# Patient Record
Sex: FEMALE | Race: BLACK OR AFRICAN AMERICAN | NOT HISPANIC OR LATINO | URBAN - METROPOLITAN AREA
[De-identification: names, ages, dates, MRNs, and addresses within clinical notes are randomized per-mention and may not be internally consistent; named-entity substitution may affect disease eponyms.]

---

## 2017-02-09 ENCOUNTER — HISTORICAL (OUTPATIENT)
Dept: ADMINISTRATIVE | Facility: HOSPITAL | Age: 77
End: 2017-02-09

## 2017-07-07 ENCOUNTER — HISTORICAL (OUTPATIENT)
Dept: ADMINISTRATIVE | Facility: HOSPITAL | Age: 77
End: 2017-07-07

## 2017-07-07 LAB
ALBUMIN SERPL-MCNC: 3.8 GM/DL (ref 3.4–5)
ALBUMIN/GLOB SERPL: 1.1 RATIO (ref 1.1–2)
ALP SERPL-CCNC: 79 UNIT/L (ref 38–126)
ALT SERPL-CCNC: 18 UNIT/L (ref 12–78)
APPEARANCE, UA: CLEAR
AST SERPL-CCNC: 11 UNIT/L (ref 15–37)
BACTERIA #/AREA URNS AUTO: ABNORMAL /HPF
BILIRUB SERPL-MCNC: 0.6 MG/DL (ref 0.2–1)
BILIRUB UR QL STRIP: NEGATIVE
BILIRUBIN DIRECT+TOT PNL SERPL-MCNC: 0.1 MG/DL (ref 0–0.5)
BILIRUBIN DIRECT+TOT PNL SERPL-MCNC: 0.5 MG/DL (ref 0–0.8)
BUN SERPL-MCNC: 42 MG/DL (ref 7–18)
CALCIUM SERPL-MCNC: 8.8 MG/DL (ref 8.5–10.1)
CHLORIDE SERPL-SCNC: 110 MMOL/L (ref 98–107)
CO2 SERPL-SCNC: 24 MMOL/L (ref 21–32)
COLOR UR: YELLOW
CREAT SERPL-MCNC: 2.45 MG/DL (ref 0.55–1.02)
DEPRECATED CALCIDIOL+CALCIFEROL SERPL-MC: 14.61 NG/ML (ref 30–80)
ERYTHROCYTE [DISTWIDTH] IN BLOOD BY AUTOMATED COUNT: 14.1 % (ref 11.5–17)
GLOBULIN SER-MCNC: 3.5 GM/DL (ref 2.4–3.5)
GLUCOSE (UA): NEGATIVE
GLUCOSE SERPL-MCNC: 80 MG/DL (ref 74–106)
HCT VFR BLD AUTO: 33 % (ref 37–47)
HGB BLD-MCNC: 10.6 GM/DL (ref 12–16)
HGB UR QL STRIP: ABNORMAL
KETONES UR QL STRIP: NEGATIVE
LEUKOCYTE ESTERASE UR QL STRIP: NEGATIVE
MCH RBC QN AUTO: 28 PG (ref 27–31)
MCHC RBC AUTO-ENTMCNC: 32.1 GM/DL (ref 33–36)
MCV RBC AUTO: 87.3 FL (ref 80–94)
NITRITE UR QL STRIP.AUTO: NEGATIVE
PH UR STRIP: 5.5 [PH] (ref 5–9)
PLATELET # BLD AUTO: 210 X10(3)/MCL (ref 130–400)
PMV BLD AUTO: 11.1 FL (ref 9.4–12.4)
POTASSIUM SERPL-SCNC: 4.2 MMOL/L (ref 3.5–5.1)
PROT SERPL-MCNC: 7.3 GM/DL (ref 6.4–8.2)
PROT UR QL STRIP: ABNORMAL
PTH-INTACT SERPL-MCNC: 327.8 PG/DL (ref 14–72)
RBC # BLD AUTO: 3.78 X10(6)/MCL (ref 4.2–5.4)
RBC #/AREA URNS HPF: ABNORMAL /[HPF]
SODIUM SERPL-SCNC: 144 MMOL/L (ref 136–145)
SP GR UR STRIP: 1.01 (ref 1–1.03)
SQUAMOUS EPITHELIAL, UA: ABNORMAL
UROBILINOGEN UR STRIP-ACNC: 0.2
WBC # SPEC AUTO: 4.3 X10(3)/MCL (ref 4.5–11.5)
WBC #/AREA URNS AUTO: ABNORMAL /HPF (ref 0–3)

## 2017-09-26 ENCOUNTER — HISTORICAL (OUTPATIENT)
Dept: RADIOLOGY | Facility: HOSPITAL | Age: 77
End: 2017-09-26

## 2017-11-07 ENCOUNTER — HISTORICAL (OUTPATIENT)
Dept: ADMINISTRATIVE | Facility: HOSPITAL | Age: 77
End: 2017-11-07

## 2017-11-07 LAB
ALBUMIN SERPL-MCNC: 4 GM/DL (ref 3.4–5)
ALBUMIN/GLOB SERPL: 1.3 {RATIO}
ALP SERPL-CCNC: 77 UNIT/L (ref 38–126)
ALT SERPL-CCNC: 15 UNIT/L (ref 12–78)
APPEARANCE, UA: CLEAR
AST SERPL-CCNC: 7 UNIT/L (ref 15–37)
BACTERIA #/AREA URNS AUTO: ABNORMAL /HPF
BILIRUB SERPL-MCNC: 0.7 MG/DL (ref 0.2–1)
BILIRUB UR QL STRIP: NEGATIVE
BILIRUBIN DIRECT+TOT PNL SERPL-MCNC: 0.2 MG/DL (ref 0–0.2)
BILIRUBIN DIRECT+TOT PNL SERPL-MCNC: 0.5 MG/DL (ref 0–0.8)
BUN SERPL-MCNC: 41 MG/DL (ref 7–18)
CALCIUM SERPL-MCNC: 8.8 MG/DL (ref 8.5–10.1)
CHLORIDE SERPL-SCNC: 110 MMOL/L (ref 98–107)
CO2 SERPL-SCNC: 24 MMOL/L (ref 21–32)
COLOR UR: YELLOW
CREAT SERPL-MCNC: 2.67 MG/DL (ref 0.55–1.02)
CREAT UR-MCNC: 55.7 MG/DL
DEPRECATED CALCIDIOL+CALCIFEROL SERPL-MC: 18.5 NG/ML (ref 30–80)
ERYTHROCYTE [DISTWIDTH] IN BLOOD BY AUTOMATED COUNT: 14.6 % (ref 11.5–17)
GLOBULIN SER-MCNC: 3.1 GM/DL (ref 2.4–3.5)
GLUCOSE (UA): NEGATIVE
GLUCOSE SERPL-MCNC: 86 MG/DL (ref 74–106)
HCT VFR BLD AUTO: 32.2 % (ref 37–47)
HGB BLD-MCNC: 10.4 GM/DL (ref 12–16)
HGB UR QL STRIP: ABNORMAL
KETONES UR QL STRIP: NEGATIVE
LEUKOCYTE ESTERASE UR QL STRIP: ABNORMAL
MAGNESIUM SERPL-MCNC: 1.8 MG/DL (ref 1.8–2.4)
MCH RBC QN AUTO: 28 PG (ref 27–31)
MCHC RBC AUTO-ENTMCNC: 32.3 GM/DL (ref 33–36)
MCV RBC AUTO: 86.6 FL (ref 80–94)
NITRITE UR QL STRIP.AUTO: NEGATIVE
PH UR STRIP: 5.5 [PH] (ref 5–9)
PLATELET # BLD AUTO: 195 X10(3)/MCL (ref 130–400)
PMV BLD AUTO: 10 FL (ref 9.4–12.4)
POTASSIUM SERPL-SCNC: 4.2 MMOL/L (ref 3.5–5.1)
PROT SERPL-MCNC: 7.1 GM/DL (ref 6.4–8.2)
PROT UR QL STRIP: ABNORMAL
PROT UR STRIP-MCNC: 74.6 MG/DL
PROT/CREAT UR-RTO: 1.3 MG/DL
PTH-INTACT SERPL-MCNC: 370.4 PG/DL (ref 14–72)
RBC # BLD AUTO: 3.72 X10(6)/MCL (ref 4.2–5.4)
RBC #/AREA URNS HPF: ABNORMAL /[HPF]
SODIUM SERPL-SCNC: 143 MMOL/L (ref 136–145)
SP GR UR STRIP: 1.01 (ref 1–1.03)
SQUAMOUS EPITHELIAL, UA: ABNORMAL
UROBILINOGEN UR STRIP-ACNC: 0.2
WBC # SPEC AUTO: 4.9 X10(3)/MCL (ref 4.5–11.5)
WBC #/AREA URNS AUTO: 15 /HPF (ref 0–3)

## 2017-11-09 ENCOUNTER — HISTORICAL (OUTPATIENT)
Dept: ADMINISTRATIVE | Facility: HOSPITAL | Age: 77
End: 2017-11-09

## 2017-11-11 LAB — FINAL CULTURE: NORMAL

## 2018-02-07 ENCOUNTER — HISTORICAL (OUTPATIENT)
Dept: ADMINISTRATIVE | Facility: HOSPITAL | Age: 78
End: 2018-02-07

## 2018-02-07 LAB
ABS NEUT (OLG): 2.85 X10(3)/MCL (ref 2.1–9.2)
ALBUMIN SERPL-MCNC: 3.9 GM/DL (ref 3.4–5)
ALBUMIN/GLOB SERPL: 1.3 RATIO (ref 1.1–2)
ALP SERPL-CCNC: 76 UNIT/L (ref 38–126)
ALT SERPL-CCNC: 15 UNIT/L (ref 12–78)
APPEARANCE, UA: CLEAR
AST SERPL-CCNC: 9 UNIT/L (ref 15–37)
BACTERIA #/AREA URNS AUTO: ABNORMAL /HPF
BILIRUB SERPL-MCNC: 0.8 MG/DL (ref 0.2–1)
BILIRUB UR QL STRIP: NEGATIVE
BILIRUBIN DIRECT+TOT PNL SERPL-MCNC: 0.1 MG/DL (ref 0–0.5)
BILIRUBIN DIRECT+TOT PNL SERPL-MCNC: 0.7 MG/DL (ref 0–0.8)
BUN SERPL-MCNC: 54 MG/DL (ref 7–18)
CALCIUM SERPL-MCNC: 9.3 MG/DL (ref 8.5–10.1)
CHLORIDE SERPL-SCNC: 108 MMOL/L (ref 98–107)
CO2 SERPL-SCNC: 25 MMOL/L (ref 21–32)
COLOR UR: YELLOW
CREAT SERPL-MCNC: 2.83 MG/DL (ref 0.55–1.02)
DEPRECATED CALCIDIOL+CALCIFEROL SERPL-MC: 28.89 NG/ML (ref 30–80)
ERYTHROCYTE [DISTWIDTH] IN BLOOD BY AUTOMATED COUNT: 14.6 % (ref 11.5–17)
EST. AVERAGE GLUCOSE BLD GHB EST-MCNC: 114 MG/DL
GLOBULIN SER-MCNC: 3.1 GM/DL (ref 2.4–3.5)
GLUCOSE (UA): NEGATIVE
GLUCOSE SERPL-MCNC: 83 MG/DL (ref 74–106)
HBA1C MFR BLD: 5.6 % (ref 4.2–6.3)
HCT VFR BLD AUTO: 32.3 % (ref 37–47)
HGB BLD-MCNC: 10.6 GM/DL (ref 12–16)
HGB UR QL STRIP: ABNORMAL
KETONES UR QL STRIP: NEGATIVE
LEUKOCYTE ESTERASE UR QL STRIP: ABNORMAL
MCH RBC QN AUTO: 27.8 PG (ref 27–31)
MCHC RBC AUTO-ENTMCNC: 32.8 GM/DL (ref 33–36)
MCV RBC AUTO: 84.8 FL (ref 80–94)
NITRITE UR QL STRIP.AUTO: NEGATIVE
PH UR STRIP: 5 [PH] (ref 5–9)
PLATELET # BLD AUTO: 220 X10(3)/MCL (ref 130–400)
PMV BLD AUTO: 11.1 FL (ref 9.4–12.4)
POTASSIUM SERPL-SCNC: 4.2 MMOL/L (ref 3.5–5.1)
PROT SERPL-MCNC: 7 GM/DL (ref 6.4–8.2)
PROT UR QL STRIP: ABNORMAL
PTH-INTACT SERPL-MCNC: 281.2 PG/ML (ref 14–72)
RBC # BLD AUTO: 3.81 X10(6)/MCL (ref 4.2–5.4)
RBC #/AREA URNS HPF: ABNORMAL /[HPF]
SODIUM SERPL-SCNC: 141 MMOL/L (ref 136–145)
SP GR UR STRIP: 1.01 (ref 1–1.03)
SQUAMOUS EPITHELIAL, UA: ABNORMAL
UROBILINOGEN UR STRIP-ACNC: 0.2
WBC # SPEC AUTO: 5.1 X10(3)/MCL (ref 4.5–11.5)
WBC #/AREA URNS AUTO: ABNORMAL /HPF (ref 0–3)

## 2018-02-15 ENCOUNTER — HISTORICAL (OUTPATIENT)
Dept: ADMINISTRATIVE | Facility: HOSPITAL | Age: 78
End: 2018-02-15

## 2018-05-08 ENCOUNTER — HISTORICAL (OUTPATIENT)
Dept: ADMINISTRATIVE | Facility: HOSPITAL | Age: 78
End: 2018-05-08

## 2018-05-08 LAB
ABS NEUT (OLG): 2.56 X10(3)/MCL (ref 2.1–9.2)
ALBUMIN SERPL-MCNC: 3.9 GM/DL (ref 3.4–5)
ALBUMIN/GLOB SERPL: 1.1 RATIO (ref 1.1–2)
ALP SERPL-CCNC: 83 UNIT/L (ref 38–126)
ALT SERPL-CCNC: 18 UNIT/L (ref 12–78)
APPEARANCE, UA: CLEAR
AST SERPL-CCNC: 10 UNIT/L (ref 15–37)
BACTERIA SPEC CULT: ABNORMAL /HPF
BASOPHILS # BLD AUTO: 0 X10(3)/MCL (ref 0–0.2)
BASOPHILS NFR BLD AUTO: 1 %
BILIRUB SERPL-MCNC: 0.8 MG/DL (ref 0.2–1)
BILIRUB UR QL STRIP: NEGATIVE
BILIRUBIN DIRECT+TOT PNL SERPL-MCNC: 0.2 MG/DL (ref 0–0.5)
BILIRUBIN DIRECT+TOT PNL SERPL-MCNC: 0.6 MG/DL (ref 0–0.8)
BUN SERPL-MCNC: 52 MG/DL (ref 7–18)
CALCIUM SERPL-MCNC: 9.2 MG/DL (ref 8.5–10.1)
CHLORIDE SERPL-SCNC: 109 MMOL/L (ref 98–107)
CO2 SERPL-SCNC: 22 MMOL/L (ref 21–32)
COLOR UR: YELLOW
CREAT SERPL-MCNC: 3.37 MG/DL (ref 0.55–1.02)
DEPRECATED CALCIDIOL+CALCIFEROL SERPL-MC: 23 NG/ML (ref 30–80)
EOSINOPHIL # BLD AUTO: 0.2 X10(3)/MCL (ref 0–0.9)
EOSINOPHIL NFR BLD AUTO: 4 %
ERYTHROCYTE [DISTWIDTH] IN BLOOD BY AUTOMATED COUNT: 14.1 % (ref 11.5–17)
GLOBULIN SER-MCNC: 3.5 GM/DL (ref 2.4–3.5)
GLUCOSE (UA): NEGATIVE
GLUCOSE SERPL-MCNC: 81 MG/DL (ref 74–106)
HCT VFR BLD AUTO: 33.6 % (ref 37–47)
HGB BLD-MCNC: 10.4 GM/DL (ref 12–16)
HGB UR QL STRIP: ABNORMAL
KETONES UR QL STRIP: NEGATIVE
LEUKOCYTE ESTERASE UR QL STRIP: NEGATIVE
LYMPHOCYTES # BLD AUTO: 1.4 X10(3)/MCL (ref 0.6–4.6)
LYMPHOCYTES NFR BLD AUTO: 29 %
MCH RBC QN AUTO: 27.5 PG (ref 27–31)
MCHC RBC AUTO-ENTMCNC: 31 GM/DL (ref 33–36)
MCV RBC AUTO: 88.9 FL (ref 80–94)
MONOCYTES # BLD AUTO: 0.6 X10(3)/MCL (ref 0.1–1.3)
MONOCYTES NFR BLD AUTO: 12 %
NEUTROPHILS # BLD AUTO: 2.56 X10(3)/MCL (ref 2.1–9.2)
NEUTROPHILS NFR BLD AUTO: 53 %
NITRITE UR QL STRIP: NEGATIVE
PH UR STRIP: 5.5 [PH] (ref 5–9)
PLATELET # BLD AUTO: 210 X10(3)/MCL (ref 130–400)
PMV BLD AUTO: 11.1 FL (ref 9.4–12.4)
POTASSIUM SERPL-SCNC: 5 MMOL/L (ref 3.5–5.1)
PROT SERPL-MCNC: 7.4 GM/DL (ref 6.4–8.2)
PROT UR QL STRIP: ABNORMAL
PTH-INTACT SERPL-MCNC: 369.8 PG/ML (ref 18.4–80.1)
RBC # BLD AUTO: 3.78 X10(6)/MCL (ref 4.2–5.4)
RBC #/AREA URNS HPF: ABNORMAL /[HPF]
SODIUM SERPL-SCNC: 142 MMOL/L (ref 136–145)
SP GR UR STRIP: 1.01 (ref 1–1.03)
SQUAMOUS EPITHELIAL, UA: ABNORMAL
UA WBC MAN: ABNORMAL
UROBILINOGEN UR STRIP-ACNC: 0.2
WBC # SPEC AUTO: 4.8 X10(3)/MCL (ref 4.5–11.5)

## 2018-07-09 ENCOUNTER — HISTORICAL (OUTPATIENT)
Dept: ADMINISTRATIVE | Facility: HOSPITAL | Age: 78
End: 2018-07-09

## 2018-07-09 LAB
ALBUMIN SERPL-MCNC: 3.6 GM/DL (ref 3.4–5)
ALBUMIN/GLOB SERPL: 1.1 RATIO (ref 1.1–2)
ALP SERPL-CCNC: 85 UNIT/L (ref 38–126)
ALT SERPL-CCNC: 16 UNIT/L (ref 12–78)
AST SERPL-CCNC: 11 UNIT/L (ref 15–37)
BILIRUB SERPL-MCNC: 1.8 MG/DL (ref 0.2–1)
BILIRUBIN DIRECT+TOT PNL SERPL-MCNC: 0.2 MG/DL (ref 0–0.5)
BILIRUBIN DIRECT+TOT PNL SERPL-MCNC: 1.6 MG/DL (ref 0–0.8)
BUN SERPL-MCNC: 61 MG/DL (ref 7–18)
CALCIUM SERPL-MCNC: 8.8 MG/DL (ref 8.5–10.1)
CHLORIDE SERPL-SCNC: 109 MMOL/L (ref 98–107)
CO2 SERPL-SCNC: 23 MMOL/L (ref 21–32)
CREAT SERPL-MCNC: 3.31 MG/DL (ref 0.55–1.02)
ERYTHROCYTE [DISTWIDTH] IN BLOOD BY AUTOMATED COUNT: 14.3 % (ref 11.5–17)
GLOBULIN SER-MCNC: 3.4 GM/DL (ref 2.4–3.5)
GLUCOSE SERPL-MCNC: 89 MG/DL (ref 74–106)
HCT VFR BLD AUTO: 30.6 % (ref 37–47)
HGB BLD-MCNC: 9.9 GM/DL (ref 12–16)
MAGNESIUM SERPL-MCNC: 1.8 MG/DL (ref 1.8–2.4)
MCH RBC QN AUTO: 27.8 PG (ref 27–31)
MCHC RBC AUTO-ENTMCNC: 32.4 GM/DL (ref 33–36)
MCV RBC AUTO: 86 FL (ref 80–94)
PHOSPHATE SERPL-MCNC: 3.5 MG/DL (ref 2.5–4.9)
PLATELET # BLD AUTO: 201 X10(3)/MCL (ref 130–400)
PMV BLD AUTO: 10.8 FL (ref 9.4–12.4)
POTASSIUM SERPL-SCNC: 4 MMOL/L (ref 3.5–5.1)
PROT SERPL-MCNC: 7 GM/DL (ref 6.4–8.2)
RBC # BLD AUTO: 3.56 X10(6)/MCL (ref 4.2–5.4)
SODIUM SERPL-SCNC: 142 MMOL/L (ref 136–145)
WBC # SPEC AUTO: 4.4 X10(3)/MCL (ref 4.5–11.5)

## 2018-09-10 ENCOUNTER — HISTORICAL (OUTPATIENT)
Dept: ADMINISTRATIVE | Facility: HOSPITAL | Age: 78
End: 2018-09-10

## 2018-09-10 LAB
ABS NEUT (OLG): 2.36 X10(3)/MCL (ref 2.1–9.2)
ALBUMIN SERPL-MCNC: 4.1 GM/DL (ref 3.4–5)
APPEARANCE, UA: CLEAR
BACTERIA #/AREA URNS AUTO: ABNORMAL /HPF
BASOPHILS # BLD AUTO: 0 X10(3)/MCL (ref 0–0.2)
BASOPHILS NFR BLD AUTO: 1 %
BILIRUB UR QL STRIP: NEGATIVE
BUN SERPL-MCNC: 70 MG/DL (ref 7–18)
CALCIUM SERPL-MCNC: 9.5 MG/DL (ref 8.5–10.1)
CHLORIDE SERPL-SCNC: 111 MMOL/L (ref 98–107)
CO2 SERPL-SCNC: 22 MMOL/L (ref 21–32)
COLOR UR: YELLOW
CREAT SERPL-MCNC: 3.94 MG/DL (ref 0.55–1.02)
DEPRECATED CALCIDIOL+CALCIFEROL SERPL-MC: 16.73 NG/ML (ref 30–80)
EOSINOPHIL # BLD AUTO: 0.1 X10(3)/MCL (ref 0–0.9)
EOSINOPHIL NFR BLD AUTO: 3 %
ERYTHROCYTE [DISTWIDTH] IN BLOOD BY AUTOMATED COUNT: 15.5 % (ref 11.5–17)
GLUCOSE (UA): NEGATIVE
GLUCOSE SERPL-MCNC: 86 MG/DL (ref 74–106)
HCT VFR BLD AUTO: 34.3 % (ref 37–47)
HGB BLD-MCNC: 10.7 GM/DL (ref 12–16)
HGB UR QL STRIP: ABNORMAL
KETONES UR QL STRIP: NEGATIVE
LEUKOCYTE ESTERASE UR QL STRIP: ABNORMAL
LYMPHOCYTES # BLD AUTO: 1.2 X10(3)/MCL (ref 0.6–4.6)
LYMPHOCYTES NFR BLD AUTO: 29 %
MCH RBC QN AUTO: 27.3 PG (ref 27–31)
MCHC RBC AUTO-ENTMCNC: 31.2 GM/DL (ref 33–36)
MCV RBC AUTO: 87.5 FL (ref 80–94)
MONOCYTES # BLD AUTO: 0.5 X10(3)/MCL (ref 0.1–1.3)
MONOCYTES NFR BLD AUTO: 12 %
NEUTROPHILS # BLD AUTO: 2.36 X10(3)/MCL (ref 1.4–7.9)
NEUTROPHILS NFR BLD AUTO: 54 %
NITRITE UR QL STRIP.AUTO: NEGATIVE
PH UR STRIP: 5 [PH] (ref 5–9)
PHOSPHATE SERPL-MCNC: 5.3 MG/DL (ref 2.5–4.9)
PLATELET # BLD AUTO: 221 X10(3)/MCL (ref 130–400)
PMV BLD AUTO: 10.2 FL (ref 9.4–12.4)
POTASSIUM SERPL-SCNC: 5.3 MMOL/L (ref 3.5–5.1)
PROT UR QL STRIP: ABNORMAL
PTH-INTACT SERPL-MCNC: 556.4 PG/ML (ref 18.4–80.1)
RBC # BLD AUTO: 3.92 X10(6)/MCL (ref 4.2–5.4)
RBC #/AREA URNS HPF: ABNORMAL /[HPF]
SODIUM SERPL-SCNC: 144 MMOL/L (ref 136–145)
SP GR UR STRIP: 1.01 (ref 1–1.03)
SQUAMOUS EPITHELIAL, UA: ABNORMAL
UROBILINOGEN UR STRIP-ACNC: 0.2
WBC # SPEC AUTO: 4.3 X10(3)/MCL (ref 4.5–11.5)
WBC #/AREA URNS AUTO: ABNORMAL /HPF (ref 0–3)

## 2018-10-08 ENCOUNTER — HISTORICAL (OUTPATIENT)
Dept: ADMINISTRATIVE | Facility: HOSPITAL | Age: 78
End: 2018-10-08

## 2018-10-08 LAB
ALBUMIN SERPL-MCNC: 3.8 GM/DL (ref 3.4–5)
BUN SERPL-MCNC: 69 MG/DL (ref 7–18)
CALCIUM SERPL-MCNC: 9.4 MG/DL (ref 8.5–10.1)
CHLORIDE SERPL-SCNC: 112 MMOL/L (ref 98–107)
CO2 SERPL-SCNC: 21 MMOL/L (ref 21–32)
CREAT SERPL-MCNC: 3.72 MG/DL (ref 0.55–1.02)
GLUCOSE SERPL-MCNC: 82 MG/DL (ref 74–106)
PHOSPHATE SERPL-MCNC: 4.8 MG/DL (ref 2.5–4.9)
POTASSIUM SERPL-SCNC: 5.4 MMOL/L (ref 3.5–5.1)
SODIUM SERPL-SCNC: 140 MMOL/L (ref 136–145)

## 2018-11-01 ENCOUNTER — HISTORICAL (OUTPATIENT)
Dept: ADMINISTRATIVE | Facility: HOSPITAL | Age: 78
End: 2018-11-01

## 2018-11-01 LAB
ABS NEUT (OLG): 2.49 X10(3)/MCL (ref 2.1–9.2)
ALBUMIN SERPL-MCNC: 3.8 GM/DL (ref 3.4–5)
BASOPHILS # BLD AUTO: 0 X10(3)/MCL (ref 0–0.2)
BASOPHILS NFR BLD AUTO: 1 %
BUN SERPL-MCNC: 71 MG/DL (ref 7–18)
CALCIUM SERPL-MCNC: 8.4 MG/DL (ref 8.5–10.1)
CHLORIDE SERPL-SCNC: 112 MMOL/L (ref 98–107)
CO2 SERPL-SCNC: 20 MMOL/L (ref 21–32)
CREAT SERPL-MCNC: 3.58 MG/DL (ref 0.55–1.02)
EOSINOPHIL # BLD AUTO: 0.1 X10(3)/MCL (ref 0–0.9)
EOSINOPHIL NFR BLD AUTO: 3 %
ERYTHROCYTE [DISTWIDTH] IN BLOOD BY AUTOMATED COUNT: 15.2 % (ref 11.5–17)
GLUCOSE SERPL-MCNC: 82 MG/DL (ref 74–106)
HCT VFR BLD AUTO: 34.9 % (ref 37–47)
HGB BLD-MCNC: 11.2 GM/DL (ref 12–16)
LYMPHOCYTES # BLD AUTO: 1.6 X10(3)/MCL (ref 0.6–4.6)
LYMPHOCYTES NFR BLD AUTO: 33 %
MAGNESIUM SERPL-MCNC: 2.1 MG/DL (ref 1.8–2.4)
MCH RBC QN AUTO: 26.9 PG (ref 27–31)
MCHC RBC AUTO-ENTMCNC: 32.1 GM/DL (ref 33–36)
MCV RBC AUTO: 83.9 FL (ref 80–94)
MONOCYTES # BLD AUTO: 0.6 X10(3)/MCL (ref 0.1–1.3)
MONOCYTES NFR BLD AUTO: 12 %
NEUTROPHILS # BLD AUTO: 2.49 X10(3)/MCL (ref 2.1–9.2)
NEUTROPHILS NFR BLD AUTO: 51 %
PHOSPHATE SERPL-MCNC: 4.6 MG/DL (ref 2.5–4.9)
PLATELET # BLD AUTO: 245 X10(3)/MCL (ref 130–400)
PMV BLD AUTO: 12 FL (ref 9.4–12.4)
POTASSIUM SERPL-SCNC: 4.7 MMOL/L (ref 3.5–5.1)
PTH-INTACT SERPL-MCNC: 373.8 PG/ML (ref 18.4–80.1)
RBC # BLD AUTO: 4.16 X10(6)/MCL (ref 4.2–5.4)
SODIUM SERPL-SCNC: 139 MMOL/L (ref 136–145)
WBC # SPEC AUTO: 4.9 X10(3)/MCL (ref 4.5–11.5)

## 2018-12-17 ENCOUNTER — HISTORICAL (OUTPATIENT)
Dept: RADIOLOGY | Facility: HOSPITAL | Age: 78
End: 2018-12-17

## 2019-01-10 ENCOUNTER — HISTORICAL (OUTPATIENT)
Dept: ADMINISTRATIVE | Facility: HOSPITAL | Age: 79
End: 2019-01-10

## 2019-01-10 LAB
ALBUMIN SERPL-MCNC: 3.8 GM/DL (ref 3.4–5)
BUN SERPL-MCNC: 58 MG/DL (ref 7–18)
CALCIUM SERPL-MCNC: 8.8 MG/DL (ref 8.5–10.1)
CHLORIDE SERPL-SCNC: 110 MMOL/L (ref 98–107)
CO2 SERPL-SCNC: 21 MMOL/L (ref 21–32)
CREAT SERPL-MCNC: 3.45 MG/DL (ref 0.55–1.02)
GLUCOSE SERPL-MCNC: 80 MG/DL (ref 74–106)
PHOSPHATE SERPL-MCNC: 4.3 MG/DL (ref 2.5–4.9)
POTASSIUM SERPL-SCNC: 4.4 MMOL/L (ref 3.5–5.1)
PTH-INTACT SERPL-MCNC: 345.7 PG/ML (ref 18.4–80.1)
SODIUM SERPL-SCNC: 141 MMOL/L (ref 136–145)

## 2019-01-28 ENCOUNTER — HOSPITAL ENCOUNTER (OUTPATIENT)
Dept: MEDSURG UNIT | Facility: HOSPITAL | Age: 79
End: 2019-01-29
Attending: INTERNAL MEDICINE | Admitting: INTERNAL MEDICINE

## 2019-01-28 LAB
ABS NEUT (OLG): 2.56 X10(3)/MCL (ref 2.1–9.2)
ALBUMIN SERPL-MCNC: 3.8 GM/DL (ref 3.4–5)
ALBUMIN/GLOB SERPL: 1 {RATIO}
ALP SERPL-CCNC: 76 UNIT/L (ref 38–126)
ALT SERPL-CCNC: 14 UNIT/L (ref 12–78)
APPEARANCE, UA: CLEAR
AST SERPL-CCNC: 9 UNIT/L (ref 15–37)
BACTERIA SPEC CULT: ABNORMAL /HPF
BASOPHILS # BLD AUTO: 0 X10(3)/MCL (ref 0–0.2)
BASOPHILS NFR BLD AUTO: 1 %
BILIRUB SERPL-MCNC: 0.5 MG/DL (ref 0.2–1)
BILIRUB UR QL STRIP: NEGATIVE
BILIRUBIN DIRECT+TOT PNL SERPL-MCNC: 0.1 MG/DL (ref 0–0.2)
BILIRUBIN DIRECT+TOT PNL SERPL-MCNC: 0.4 MG/DL (ref 0–0.8)
BUN SERPL-MCNC: 64 MG/DL (ref 7–18)
CALCIUM SERPL-MCNC: 8.6 MG/DL (ref 8.5–10.1)
CHLORIDE SERPL-SCNC: 109 MMOL/L (ref 98–107)
CK MB SERPL-MCNC: 1.9 NG/ML (ref 0.5–3.6)
CK MB SERPL-MCNC: 2.5 NG/ML (ref 0.5–3.6)
CK MB SERPL-MCNC: 2.6 NG/ML (ref 0.5–3.6)
CK SERPL-CCNC: 108 UNIT/L (ref 26–192)
CK SERPL-CCNC: 114 UNIT/L (ref 26–192)
CK SERPL-CCNC: 152 UNIT/L (ref 26–192)
CO2 SERPL-SCNC: 21 MMOL/L (ref 21–32)
COLOR UR: YELLOW
CREAT SERPL-MCNC: 3.64 MG/DL (ref 0.55–1.02)
EOSINOPHIL # BLD AUTO: 0.1 X10(3)/MCL (ref 0–0.9)
EOSINOPHIL NFR BLD AUTO: 2 %
ERYTHROCYTE [DISTWIDTH] IN BLOOD BY AUTOMATED COUNT: 16.1 % (ref 11.5–17)
GLOBULIN SER-MCNC: 3.9 GM/DL (ref 2.4–3.5)
GLUCOSE (UA): NEGATIVE
GLUCOSE SERPL-MCNC: 89 MG/DL (ref 74–106)
HCT VFR BLD AUTO: 29.2 % (ref 37–47)
HGB BLD-MCNC: 9.1 GM/DL (ref 12–16)
HGB UR QL STRIP: NEGATIVE
KETONES UR QL STRIP: NEGATIVE
LEUKOCYTE ESTERASE UR QL STRIP: NEGATIVE
LYMPHOCYTES # BLD AUTO: 1.5 X10(3)/MCL (ref 0.6–4.6)
LYMPHOCYTES NFR BLD AUTO: 31 %
MCH RBC QN AUTO: 26.6 PG (ref 27–31)
MCHC RBC AUTO-ENTMCNC: 31.2 GM/DL (ref 33–36)
MCV RBC AUTO: 85.4 FL (ref 80–94)
MONOCYTES # BLD AUTO: 0.6 X10(3)/MCL (ref 0.1–1.3)
MONOCYTES NFR BLD AUTO: 13 %
NEUTROPHILS # BLD AUTO: 2.56 X10(3)/MCL (ref 2.1–9.2)
NEUTROPHILS NFR BLD AUTO: 53 %
NITRITE UR QL STRIP: NEGATIVE
PH UR STRIP: 5 [PH] (ref 5–9)
PLATELET # BLD AUTO: 191 X10(3)/MCL (ref 130–400)
PMV BLD AUTO: 11 FL (ref 9.4–12.4)
POTASSIUM SERPL-SCNC: 4 MMOL/L (ref 3.5–5.1)
PROT SERPL-MCNC: 7.7 GM/DL (ref 6.4–8.2)
PROT UR QL STRIP: ABNORMAL
RBC # BLD AUTO: 3.42 X10(6)/MCL (ref 4.2–5.4)
RBC #/AREA URNS HPF: ABNORMAL /[HPF]
SODIUM SERPL-SCNC: 140 MMOL/L (ref 136–145)
SP GR UR STRIP: 1.01 (ref 1–1.03)
SQUAMOUS EPITHELIAL, UA: ABNORMAL
TROPONIN I SERPL-MCNC: <0.02 NG/ML (ref 0.02–0.49)
UROBILINOGEN UR STRIP-ACNC: 0.2
WBC # SPEC AUTO: 4.8 X10(3)/MCL (ref 4.5–11.5)
WBC #/AREA URNS HPF: ABNORMAL /[HPF]

## 2019-03-14 ENCOUNTER — HISTORICAL (OUTPATIENT)
Dept: ADMINISTRATIVE | Facility: HOSPITAL | Age: 79
End: 2019-03-14

## 2019-03-14 LAB
ALBUMIN SERPL-MCNC: 3.9 GM/DL (ref 3.4–5)
ALBUMIN/GLOB SERPL: 1.2 RATIO (ref 1.1–2)
ALP SERPL-CCNC: 72 UNIT/L (ref 38–126)
ALT SERPL-CCNC: 13 UNIT/L (ref 12–78)
APPEARANCE, UA: CLEAR
AST SERPL-CCNC: 7 UNIT/L (ref 15–37)
BACTERIA #/AREA URNS AUTO: ABNORMAL /HPF
BILIRUB SERPL-MCNC: 0.7 MG/DL (ref 0.2–1)
BILIRUB UR QL STRIP: NEGATIVE
BILIRUBIN DIRECT+TOT PNL SERPL-MCNC: 0.2 MG/DL (ref 0–0.5)
BILIRUBIN DIRECT+TOT PNL SERPL-MCNC: 0.5 MG/DL (ref 0–0.8)
BUN SERPL-MCNC: 69 MG/DL (ref 7–18)
CALCIUM SERPL-MCNC: 9 MG/DL (ref 8.5–10.1)
CHLORIDE SERPL-SCNC: 112 MMOL/L (ref 98–107)
CO2 SERPL-SCNC: 20 MMOL/L (ref 21–32)
COLOR UR: YELLOW
CREAT SERPL-MCNC: 3.5 MG/DL (ref 0.55–1.02)
DEPRECATED CALCIDIOL+CALCIFEROL SERPL-MC: 20.57 NG/ML (ref 30–80)
ERYTHROCYTE [DISTWIDTH] IN BLOOD BY AUTOMATED COUNT: 15.8 % (ref 11.5–17)
GLOBULIN SER-MCNC: 3.2 GM/DL (ref 2.4–3.5)
GLUCOSE (UA): NEGATIVE
GLUCOSE SERPL-MCNC: 84 MG/DL (ref 74–106)
HCT VFR BLD AUTO: 32.1 % (ref 37–47)
HGB BLD-MCNC: 9.9 GM/DL (ref 12–16)
HGB UR QL STRIP: NEGATIVE
KETONES UR QL STRIP: NEGATIVE
LEUKOCYTE ESTERASE UR QL STRIP: NEGATIVE
MAGNESIUM SERPL-MCNC: 2.1 MG/DL (ref 1.8–2.4)
MCH RBC QN AUTO: 28 PG (ref 27–31)
MCHC RBC AUTO-ENTMCNC: 30.8 GM/DL (ref 33–36)
MCV RBC AUTO: 90.7 FL (ref 80–94)
NITRITE UR QL STRIP.AUTO: NEGATIVE
PH UR STRIP: 5 [PH] (ref 5–9)
PLATELET # BLD AUTO: 230 X10(3)/MCL (ref 130–400)
PMV BLD AUTO: 11 FL (ref 9.4–12.4)
POTASSIUM SERPL-SCNC: 4.4 MMOL/L (ref 3.5–5.1)
PROT SERPL-MCNC: 7.1 GM/DL (ref 6.4–8.2)
PROT UR QL STRIP: ABNORMAL
PTH-INTACT SERPL-MCNC: 269.1 PG/ML (ref 18.4–80.1)
RBC # BLD AUTO: 3.54 X10(6)/MCL (ref 4.2–5.4)
RBC #/AREA URNS HPF: ABNORMAL /[HPF]
SODIUM SERPL-SCNC: 141 MMOL/L (ref 136–145)
SP GR UR STRIP: 1.01 (ref 1–1.03)
SQUAMOUS EPITHELIAL, UA: ABNORMAL
UROBILINOGEN UR STRIP-ACNC: 0.2
WBC # SPEC AUTO: 3.7 X10(3)/MCL (ref 4.5–11.5)
WBC #/AREA URNS AUTO: ABNORMAL /HPF (ref 0–3)

## 2019-05-09 ENCOUNTER — HISTORICAL (OUTPATIENT)
Dept: ADMINISTRATIVE | Facility: HOSPITAL | Age: 79
End: 2019-05-09

## 2019-05-09 LAB
ALBUMIN SERPL-MCNC: 3.5 GM/DL (ref 3.4–5)
ALBUMIN/GLOB SERPL: 1 RATIO (ref 1.1–2)
ALP SERPL-CCNC: 70 UNIT/L (ref 38–126)
ALT SERPL-CCNC: 18 UNIT/L (ref 12–78)
APPEARANCE, UA: CLEAR
AST SERPL-CCNC: 7 UNIT/L (ref 15–37)
BACTERIA #/AREA URNS AUTO: ABNORMAL /HPF
BILIRUB SERPL-MCNC: 0.6 MG/DL (ref 0.2–1)
BILIRUB UR QL STRIP: NEGATIVE
BILIRUBIN DIRECT+TOT PNL SERPL-MCNC: 0.2 MG/DL (ref 0–0.5)
BILIRUBIN DIRECT+TOT PNL SERPL-MCNC: 0.4 MG/DL (ref 0–0.8)
BUN SERPL-MCNC: 73 MG/DL (ref 7–18)
CALCIUM SERPL-MCNC: 8.5 MG/DL (ref 8.5–10.1)
CHLORIDE SERPL-SCNC: 112 MMOL/L (ref 98–107)
CO2 SERPL-SCNC: 20 MMOL/L (ref 21–32)
COLOR UR: YELLOW
CREAT SERPL-MCNC: 3.86 MG/DL (ref 0.55–1.02)
CREAT UR-MCNC: 54 MG/DL
ERYTHROCYTE [DISTWIDTH] IN BLOOD BY AUTOMATED COUNT: 15.1 % (ref 11.5–17)
GLOBULIN SER-MCNC: 3.5 GM/DL (ref 2.4–3.5)
GLUCOSE (UA): NEGATIVE
GLUCOSE SERPL-MCNC: 83 MG/DL (ref 74–106)
HCT VFR BLD AUTO: 34.4 % (ref 37–47)
HGB BLD-MCNC: 10.5 GM/DL (ref 12–16)
HGB UR QL STRIP: NEGATIVE
KETONES UR QL STRIP: NEGATIVE
LEUKOCYTE ESTERASE UR QL STRIP: NEGATIVE
MCH RBC QN AUTO: 26.6 PG (ref 27–31)
MCHC RBC AUTO-ENTMCNC: 30.5 GM/DL (ref 33–36)
MCV RBC AUTO: 87.3 FL (ref 80–94)
MICROALBUMIN UR-MCNC: 26.7 MG/DL
MICROALBUMIN/CREAT RATIO PNL UR: 494.4 MG/GM CR (ref 0–30)
NITRITE UR QL STRIP.AUTO: NEGATIVE
PH UR STRIP: 5 [PH] (ref 5–9)
PLATELET # BLD AUTO: 232 X10(3)/MCL (ref 130–400)
PMV BLD AUTO: 12.1 FL (ref 9.4–12.4)
POTASSIUM SERPL-SCNC: 4.2 MMOL/L (ref 3.5–5.1)
PROT SERPL-MCNC: 7 GM/DL (ref 6.4–8.2)
PROT UR QL STRIP: ABNORMAL
PTH-INTACT SERPL-MCNC: 572.7 PG/ML (ref 18.4–80.1)
RBC # BLD AUTO: 3.94 X10(6)/MCL (ref 4.2–5.4)
RBC #/AREA URNS HPF: ABNORMAL /[HPF]
SODIUM SERPL-SCNC: 140 MMOL/L (ref 136–145)
SP GR UR STRIP: 1.01 (ref 1–1.03)
SQUAMOUS EPITHELIAL, UA: ABNORMAL
UROBILINOGEN UR STRIP-ACNC: 0.2
WBC # SPEC AUTO: 5 X10(3)/MCL (ref 4.5–11.5)
WBC #/AREA URNS AUTO: ABNORMAL /HPF (ref 0–3)

## 2019-06-13 ENCOUNTER — HISTORICAL (OUTPATIENT)
Dept: ADMINISTRATIVE | Facility: HOSPITAL | Age: 79
End: 2019-06-13

## 2019-06-13 LAB
ALBUMIN SERPL-MCNC: 4 GM/DL (ref 3.4–5)
BUN SERPL-MCNC: 70 MG/DL (ref 7–18)
CALCIUM SERPL-MCNC: 9.3 MG/DL (ref 8.5–10.1)
CHLORIDE SERPL-SCNC: 105 MMOL/L (ref 98–107)
CO2 SERPL-SCNC: 22 MMOL/L (ref 21–32)
CREAT SERPL-MCNC: 3.85 MG/DL (ref 0.55–1.02)
GLUCOSE SERPL-MCNC: 88 MG/DL (ref 74–106)
PHOSPHATE SERPL-MCNC: 4.8 MG/DL (ref 2.5–4.9)
POTASSIUM SERPL-SCNC: 4.4 MMOL/L (ref 3.5–5.1)
SODIUM SERPL-SCNC: 140 MMOL/L (ref 136–145)

## 2019-07-19 ENCOUNTER — HISTORICAL (OUTPATIENT)
Dept: ADMINISTRATIVE | Facility: HOSPITAL | Age: 79
End: 2019-07-19

## 2019-07-19 LAB
ALBUMIN SERPL-MCNC: 4 GM/DL (ref 3.4–5)
ALBUMIN/GLOB SERPL: 1.1 {RATIO}
ALP SERPL-CCNC: 78 UNIT/L (ref 38–126)
ALT SERPL-CCNC: 16 UNIT/L (ref 12–78)
AST SERPL-CCNC: 9 UNIT/L (ref 15–37)
BILIRUB SERPL-MCNC: 0.6 MG/DL (ref 0.2–1)
BILIRUBIN DIRECT+TOT PNL SERPL-MCNC: 0.1 MG/DL (ref 0–0.2)
BILIRUBIN DIRECT+TOT PNL SERPL-MCNC: 0.5 MG/DL (ref 0–0.8)
BUN SERPL-MCNC: 80 MG/DL (ref 7–18)
CALCIUM SERPL-MCNC: 9.2 MG/DL (ref 8.5–10.1)
CHLORIDE SERPL-SCNC: 108 MMOL/L (ref 98–107)
CO2 SERPL-SCNC: 20 MMOL/L (ref 21–32)
CREAT SERPL-MCNC: 4.21 MG/DL (ref 0.55–1.02)
ERYTHROCYTE [DISTWIDTH] IN BLOOD BY AUTOMATED COUNT: 15.9 % (ref 11.5–17)
GLOBULIN SER-MCNC: 3.7 GM/DL (ref 2.4–3.5)
GLUCOSE SERPL-MCNC: 84 MG/DL (ref 74–106)
HCT VFR BLD AUTO: 32.8 % (ref 37–47)
HGB BLD-MCNC: 10.4 GM/DL (ref 12–16)
MAGNESIUM SERPL-MCNC: 2.1 MG/DL (ref 1.8–2.4)
MCH RBC QN AUTO: 26.8 PG (ref 27–31)
MCHC RBC AUTO-ENTMCNC: 31.7 GM/DL (ref 33–36)
MCV RBC AUTO: 84.5 FL (ref 80–94)
PLATELET # BLD AUTO: 215 X10(3)/MCL (ref 130–400)
PMV BLD AUTO: 11.5 FL (ref 9.4–12.4)
POTASSIUM SERPL-SCNC: 4.4 MMOL/L (ref 3.5–5.1)
PROT SERPL-MCNC: 7.7 GM/DL (ref 6.4–8.2)
RBC # BLD AUTO: 3.88 X10(6)/MCL (ref 4.2–5.4)
SODIUM SERPL-SCNC: 138 MMOL/L (ref 136–145)
WBC # SPEC AUTO: 4.3 X10(3)/MCL (ref 4.5–11.5)

## 2019-08-16 ENCOUNTER — HISTORICAL (OUTPATIENT)
Dept: ADMINISTRATIVE | Facility: HOSPITAL | Age: 79
End: 2019-08-16

## 2019-08-16 LAB
ALBUMIN SERPL-MCNC: 3.8 GM/DL (ref 3.4–5)
BUN SERPL-MCNC: 73 MG/DL (ref 7–18)
CALCIUM SERPL-MCNC: 9.1 MG/DL (ref 8.5–10.1)
CHLORIDE SERPL-SCNC: 110 MMOL/L (ref 98–107)
CO2 SERPL-SCNC: 22 MMOL/L (ref 21–32)
CREAT SERPL-MCNC: 3.81 MG/DL (ref 0.55–1.02)
ERYTHROCYTE [DISTWIDTH] IN BLOOD BY AUTOMATED COUNT: 16.5 % (ref 11.5–17)
GLUCOSE SERPL-MCNC: 85 MG/DL (ref 74–106)
HCT VFR BLD AUTO: 28.4 % (ref 37–47)
HGB BLD-MCNC: 8.8 GM/DL (ref 12–16)
MAGNESIUM SERPL-MCNC: 1.9 MG/DL (ref 1.8–2.4)
MCH RBC QN AUTO: 26.3 PG (ref 27–31)
MCHC RBC AUTO-ENTMCNC: 31 GM/DL (ref 33–36)
MCV RBC AUTO: 85 FL (ref 80–94)
PHOSPHATE SERPL-MCNC: 4.4 MG/DL (ref 2.5–4.9)
PLATELET # BLD AUTO: 194 X10(3)/MCL (ref 130–400)
PMV BLD AUTO: 10.7 FL (ref 9.4–12.4)
POTASSIUM SERPL-SCNC: 4.4 MMOL/L (ref 3.5–5.1)
RBC # BLD AUTO: 3.34 X10(6)/MCL (ref 4.2–5.4)
SODIUM SERPL-SCNC: 142 MMOL/L (ref 136–145)
WBC # SPEC AUTO: 4.4 X10(3)/MCL (ref 4.5–11.5)

## 2019-10-04 ENCOUNTER — HISTORICAL (OUTPATIENT)
Dept: ADMINISTRATIVE | Facility: HOSPITAL | Age: 79
End: 2019-10-04

## 2019-10-04 LAB
ALBUMIN SERPL-MCNC: 4 GM/DL (ref 3.4–5)
BUN SERPL-MCNC: 89 MG/DL (ref 7–18)
CALCIUM SERPL-MCNC: 8.6 MG/DL (ref 8.5–10.1)
CHLORIDE SERPL-SCNC: 110 MMOL/L (ref 98–107)
CO2 SERPL-SCNC: 21 MMOL/L (ref 21–32)
CREAT SERPL-MCNC: 4.92 MG/DL (ref 0.55–1.02)
ERYTHROCYTE [DISTWIDTH] IN BLOOD BY AUTOMATED COUNT: 15.6 % (ref 11.5–17)
GLUCOSE SERPL-MCNC: 85 MG/DL (ref 74–106)
HCT VFR BLD AUTO: 31.2 % (ref 37–47)
HGB BLD-MCNC: 9.9 GM/DL (ref 12–16)
MCH RBC QN AUTO: 27.9 PG (ref 27–31)
MCHC RBC AUTO-ENTMCNC: 31.7 GM/DL (ref 33–36)
MCV RBC AUTO: 87.9 FL (ref 80–94)
PHOSPHATE SERPL-MCNC: 4.4 MG/DL (ref 2.5–4.9)
PLATELET # BLD AUTO: 212 X10(3)/MCL (ref 130–400)
PMV BLD AUTO: 11.4 FL (ref 9.4–12.4)
POTASSIUM SERPL-SCNC: 4.5 MMOL/L (ref 3.5–5.1)
RBC # BLD AUTO: 3.55 X10(6)/MCL (ref 4.2–5.4)
SODIUM SERPL-SCNC: 140 MMOL/L (ref 136–145)
WBC # SPEC AUTO: 3.8 X10(3)/MCL (ref 4.5–11.5)

## 2019-11-07 ENCOUNTER — HISTORICAL (OUTPATIENT)
Dept: ADMINISTRATIVE | Facility: HOSPITAL | Age: 79
End: 2019-11-07

## 2019-11-07 LAB
ALBUMIN SERPL-MCNC: 3.8 GM/DL (ref 3.4–5)
BUN SERPL-MCNC: 78 MG/DL (ref 7–18)
CALCIUM SERPL-MCNC: 8.5 MG/DL (ref 8.5–10.1)
CHLORIDE SERPL-SCNC: 114 MMOL/L (ref 98–107)
CO2 SERPL-SCNC: 20 MMOL/L (ref 21–32)
CREAT SERPL-MCNC: 4.68 MG/DL (ref 0.55–1.02)
ERYTHROCYTE [DISTWIDTH] IN BLOOD BY AUTOMATED COUNT: 15.2 % (ref 11.5–17)
GLUCOSE SERPL-MCNC: 86 MG/DL (ref 74–106)
HCT VFR BLD AUTO: 27.7 % (ref 37–47)
HGB BLD-MCNC: 8.6 GM/DL (ref 12–16)
MAGNESIUM SERPL-MCNC: 1.6 MG/DL (ref 1.8–2.4)
MCH RBC QN AUTO: 27.7 PG (ref 27–31)
MCHC RBC AUTO-ENTMCNC: 31 GM/DL (ref 33–36)
MCV RBC AUTO: 89.1 FL (ref 80–94)
PHOSPHATE SERPL-MCNC: 4.5 MG/DL (ref 2.5–4.9)
PLATELET # BLD AUTO: 178 X10(3)/MCL (ref 130–400)
PMV BLD AUTO: 10.5 FL (ref 9.4–12.4)
POTASSIUM SERPL-SCNC: 4.8 MMOL/L (ref 3.5–5.1)
RBC # BLD AUTO: 3.11 X10(6)/MCL (ref 4.2–5.4)
SODIUM SERPL-SCNC: 141 MMOL/L (ref 136–145)
WBC # SPEC AUTO: 4.2 X10(3)/MCL (ref 4.5–11.5)

## 2019-12-18 ENCOUNTER — HISTORICAL (OUTPATIENT)
Dept: RADIOLOGY | Facility: HOSPITAL | Age: 79
End: 2019-12-18

## 2019-12-30 ENCOUNTER — HISTORICAL (OUTPATIENT)
Dept: ADMINISTRATIVE | Facility: HOSPITAL | Age: 79
End: 2019-12-30

## 2019-12-30 LAB
ALBUMIN SERPL-MCNC: 3.5 GM/DL (ref 3.4–5)
BUN SERPL-MCNC: 63 MG/DL (ref 7–18)
CALCIUM SERPL-MCNC: 7.9 MG/DL (ref 8.5–10.1)
CHLORIDE SERPL-SCNC: 116 MMOL/L (ref 98–107)
CO2 SERPL-SCNC: 18 MMOL/L (ref 21–32)
CREAT SERPL-MCNC: 4.43 MG/DL (ref 0.55–1.02)
ERYTHROCYTE [DISTWIDTH] IN BLOOD BY AUTOMATED COUNT: 15.9 % (ref 11.5–17)
GLUCOSE SERPL-MCNC: 87 MG/DL (ref 74–106)
HCT VFR BLD AUTO: 29.2 % (ref 37–47)
HGB BLD-MCNC: 9.1 GM/DL (ref 12–16)
MCH RBC QN AUTO: 27.3 PG (ref 27–31)
MCHC RBC AUTO-ENTMCNC: 31.2 GM/DL (ref 33–36)
MCV RBC AUTO: 87.7 FL (ref 80–94)
PHOSPHATE SERPL-MCNC: 4.4 MG/DL (ref 2.5–4.9)
PLATELET # BLD AUTO: 237 X10(3)/MCL (ref 130–400)
PMV BLD AUTO: 11.3 FL (ref 9.4–12.4)
POTASSIUM SERPL-SCNC: 4.8 MMOL/L (ref 3.5–5.1)
PTH-INTACT SERPL-MCNC: 628.3 PG/ML (ref 18.4–80.1)
RBC # BLD AUTO: 3.33 X10(6)/MCL (ref 4.2–5.4)
SODIUM SERPL-SCNC: 144 MMOL/L (ref 136–145)
WBC # SPEC AUTO: 4.3 X10(3)/MCL (ref 4.5–11.5)

## 2020-01-21 ENCOUNTER — HISTORICAL (OUTPATIENT)
Dept: PREADMISSION TESTING | Facility: HOSPITAL | Age: 80
End: 2020-01-21

## 2020-01-21 LAB
ABS NEUT (OLG): 2.56 X10(3)/MCL (ref 2.1–9.2)
BASOPHILS # BLD AUTO: 0 X10(3)/MCL (ref 0–0.2)
BASOPHILS NFR BLD AUTO: 1 %
BUN SERPL-MCNC: 67 MG/DL (ref 7–18)
CALCIUM SERPL-MCNC: 8.2 MG/DL (ref 8.5–10.1)
CHLORIDE SERPL-SCNC: 111 MMOL/L (ref 98–107)
CO2 SERPL-SCNC: 19 MMOL/L (ref 21–32)
CREAT SERPL-MCNC: 4.37 MG/DL (ref 0.55–1.02)
CREAT/UREA NIT SERPL: 15.3
EOSINOPHIL # BLD AUTO: 0.2 X10(3)/MCL (ref 0–0.9)
EOSINOPHIL NFR BLD AUTO: 4 %
ERYTHROCYTE [DISTWIDTH] IN BLOOD BY AUTOMATED COUNT: 17.1 % (ref 11.5–17)
GLUCOSE SERPL-MCNC: 82 MG/DL (ref 74–106)
HCT VFR BLD AUTO: 31.3 % (ref 37–47)
HGB BLD-MCNC: 9.7 GM/DL (ref 12–16)
LYMPHOCYTES # BLD AUTO: 1.2 X10(3)/MCL (ref 0.6–4.6)
LYMPHOCYTES NFR BLD AUTO: 25 %
MCH RBC QN AUTO: 27.2 PG (ref 27–31)
MCHC RBC AUTO-ENTMCNC: 31 GM/DL (ref 33–36)
MCV RBC AUTO: 87.7 FL (ref 80–94)
MONOCYTES # BLD AUTO: 0.7 X10(3)/MCL (ref 0.1–1.3)
MONOCYTES NFR BLD AUTO: 14 %
NEUTROPHILS # BLD AUTO: 2.56 X10(3)/MCL (ref 2.1–9.2)
NEUTROPHILS NFR BLD AUTO: 55 %
PLATELET # BLD AUTO: 252 X10(3)/MCL (ref 130–400)
PMV BLD AUTO: 10.8 FL (ref 9.4–12.4)
POTASSIUM SERPL-SCNC: 5.1 MMOL/L (ref 3.5–5.1)
RBC # BLD AUTO: 3.57 X10(6)/MCL (ref 4.2–5.4)
SODIUM SERPL-SCNC: 139 MMOL/L (ref 136–145)
WBC # SPEC AUTO: 4.6 X10(3)/MCL (ref 4.5–11.5)

## 2020-01-31 ENCOUNTER — HISTORICAL (OUTPATIENT)
Dept: SURGERY | Facility: HOSPITAL | Age: 80
End: 2020-01-31

## 2020-01-31 LAB
ANION GAP SERPL CALC-SCNC: 14 MMOL/L
BUN SERPL-MCNC: >50 MG/DL (ref 8–26)
CHLORIDE SERPL-SCNC: 112 MMOL/L (ref 98–109)
CREAT SERPL-MCNC: 5.1 MG/DL (ref 0.6–1.3)
GLUCOSE SERPL-MCNC: 85 MG/DL (ref 70–105)
HCT VFR BLD CALC: 33 % (ref 38–51)
HGB BLD-MCNC: 11.2 MG/DL (ref 12–17)
POC IONIZED CALCIUM: 1.21 MMOL/L (ref 1.12–1.32)
POC TCO2: 19 MMOL/L (ref 24–29)
POTASSIUM BLD-SCNC: 4.4 MMOL/L (ref 3.5–4.9)
SODIUM BLD-SCNC: 139 MMOL/L (ref 138–146)

## 2020-02-24 ENCOUNTER — HISTORICAL (OUTPATIENT)
Dept: ADMINISTRATIVE | Facility: HOSPITAL | Age: 80
End: 2020-02-24

## 2020-02-24 LAB
ALBUMIN SERPL-MCNC: 3.6 GM/DL (ref 3.4–5)
ALBUMIN/GLOB SERPL: 0.9 {RATIO}
ALP SERPL-CCNC: 83 UNIT/L (ref 38–126)
ALT SERPL-CCNC: 17 UNIT/L (ref 12–78)
APPEARANCE, UA: CLEAR
AST SERPL-CCNC: 7 UNIT/L (ref 15–37)
BACTERIA #/AREA URNS AUTO: ABNORMAL /HPF
BILIRUB SERPL-MCNC: 0.7 MG/DL (ref 0.2–1)
BILIRUB UR QL STRIP: NEGATIVE
BILIRUBIN DIRECT+TOT PNL SERPL-MCNC: 0.1 MG/DL (ref 0–0.2)
BILIRUBIN DIRECT+TOT PNL SERPL-MCNC: 0.6 MG/DL (ref 0–0.8)
BUN SERPL-MCNC: 84 MG/DL (ref 7–18)
CALCIUM SERPL-MCNC: 8.4 MG/DL (ref 8.5–10.1)
CHLORIDE SERPL-SCNC: 108 MMOL/L (ref 98–107)
CO2 SERPL-SCNC: 20 MMOL/L (ref 21–32)
COLOR UR: YELLOW
CREAT SERPL-MCNC: 5.19 MG/DL (ref 0.55–1.02)
ERYTHROCYTE [DISTWIDTH] IN BLOOD BY AUTOMATED COUNT: 16.1 % (ref 11.5–17)
GLOBULIN SER-MCNC: 3.9 GM/DL (ref 2.4–3.5)
GLUCOSE (UA): NEGATIVE
GLUCOSE SERPL-MCNC: 86 MG/DL (ref 74–106)
HCT VFR BLD AUTO: 26.5 % (ref 37–47)
HGB BLD-MCNC: 8.2 GM/DL (ref 12–16)
HGB UR QL STRIP: ABNORMAL
KETONES UR QL STRIP: NEGATIVE
LEUKOCYTE ESTERASE UR QL STRIP: ABNORMAL
MCH RBC QN AUTO: 27.3 PG (ref 27–31)
MCHC RBC AUTO-ENTMCNC: 30.9 GM/DL (ref 33–36)
MCV RBC AUTO: 88.3 FL (ref 80–94)
NITRITE UR QL STRIP.AUTO: NEGATIVE
PH UR STRIP: 5 [PH] (ref 5–9)
PLATELET # BLD AUTO: 247 X10(3)/MCL (ref 130–400)
PMV BLD AUTO: 10.5 FL (ref 9.4–12.4)
POTASSIUM SERPL-SCNC: 4.5 MMOL/L (ref 3.5–5.1)
PROT SERPL-MCNC: 7.5 GM/DL (ref 6.4–8.2)
PROT UR QL STRIP: ABNORMAL
RBC # BLD AUTO: 3 X10(6)/MCL (ref 4.2–5.4)
RBC #/AREA URNS HPF: ABNORMAL /[HPF]
SODIUM SERPL-SCNC: 138 MMOL/L (ref 136–145)
SP GR UR STRIP: 1.01 (ref 1–1.03)
SQUAMOUS EPITHELIAL, UA: ABNORMAL
UROBILINOGEN UR STRIP-ACNC: 0.2
WBC # SPEC AUTO: 4.1 X10(3)/MCL (ref 4.5–11.5)
WBC #/AREA URNS AUTO: ABNORMAL /HPF (ref 0–3)

## 2020-03-13 ENCOUNTER — HISTORICAL (OUTPATIENT)
Dept: ADMINISTRATIVE | Facility: HOSPITAL | Age: 80
End: 2020-03-13

## 2020-03-13 LAB
ALBUMIN SERPL-MCNC: 3.4 GM/DL (ref 3.4–5)
BUN SERPL-MCNC: 137 MG/DL (ref 7–18)
CALCIUM SERPL-MCNC: 6.5 MG/DL (ref 8.5–10.1)
CHLORIDE SERPL-SCNC: 105 MMOL/L (ref 98–107)
CO2 SERPL-SCNC: 15 MMOL/L (ref 21–32)
CREAT SERPL-MCNC: 8.38 MG/DL (ref 0.55–1.02)
ERYTHROCYTE [DISTWIDTH] IN BLOOD BY AUTOMATED COUNT: 16.5 % (ref 11.5–17)
GLUCOSE SERPL-MCNC: 86 MG/DL (ref 74–106)
HCT VFR BLD AUTO: 24.2 % (ref 37–47)
HGB BLD-MCNC: 7.4 GM/DL (ref 12–16)
MAGNESIUM SERPL-MCNC: 1.6 MG/DL (ref 1.8–2.4)
MCH RBC QN AUTO: 26.2 PG (ref 27–31)
MCHC RBC AUTO-ENTMCNC: 30.6 GM/DL (ref 33–36)
MCV RBC AUTO: 85.8 FL (ref 80–94)
PHOSPHATE SERPL-MCNC: 8.5 MG/DL (ref 2.5–4.9)
PLATELET # BLD AUTO: 195 X10(3)/MCL (ref 130–400)
PMV BLD AUTO: 11.3 FL (ref 9.4–12.4)
POTASSIUM SERPL-SCNC: 5.2 MMOL/L (ref 3.5–5.1)
RBC # BLD AUTO: 2.82 X10(6)/MCL (ref 4.2–5.4)
SODIUM SERPL-SCNC: 134 MMOL/L (ref 136–145)
WBC # SPEC AUTO: 2.7 X10(3)/MCL (ref 4.5–11.5)

## 2020-05-05 LAB
ABS NEUT (OLG): 2.98 X10(3)/MCL (ref 2.1–9.2)
BASOPHILS # BLD AUTO: 0 X10(3)/MCL (ref 0–0.2)
BASOPHILS NFR BLD AUTO: 1 %
BUN SERPL-MCNC: 38 MG/DL (ref 9.8–20.1)
CALCIUM SERPL-MCNC: 8.8 MG/DL (ref 8.4–10.2)
CHLORIDE SERPL-SCNC: 103 MMOL/L (ref 98–107)
CO2 SERPL-SCNC: 25 MMOL/L (ref 23–31)
CREAT SERPL-MCNC: 5.86 MG/DL (ref 0.55–1.02)
CREAT/UREA NIT SERPL: 6
EOSINOPHIL # BLD AUTO: 0.3 X10(3)/MCL (ref 0–0.9)
EOSINOPHIL NFR BLD AUTO: 4 %
ERYTHROCYTE [DISTWIDTH] IN BLOOD BY AUTOMATED COUNT: 18.9 % (ref 11.5–17)
GLUCOSE SERPL-MCNC: 94 MG/DL (ref 82–115)
HCT VFR BLD AUTO: 35.3 % (ref 37–47)
HGB BLD-MCNC: 10.9 GM/DL (ref 12–16)
LYMPHOCYTES # BLD AUTO: 2 X10(3)/MCL (ref 0.6–4.6)
LYMPHOCYTES NFR BLD AUTO: 33 %
MCH RBC QN AUTO: 28.8 PG (ref 27–31)
MCHC RBC AUTO-ENTMCNC: 30.9 GM/DL (ref 33–36)
MCV RBC AUTO: 93.1 FL (ref 80–94)
MONOCYTES # BLD AUTO: 0.8 X10(3)/MCL (ref 0.1–1.3)
MONOCYTES NFR BLD AUTO: 13 %
NEUTROPHILS # BLD AUTO: 2.98 X10(3)/MCL (ref 2.1–9.2)
NEUTROPHILS NFR BLD AUTO: 48 %
PLATELET # BLD AUTO: 277 X10(3)/MCL (ref 130–400)
PMV BLD AUTO: 10.5 FL (ref 9.4–12.4)
POTASSIUM SERPL-SCNC: 4.3 MMOL/L (ref 3.5–5.1)
RBC # BLD AUTO: 3.79 X10(6)/MCL (ref 4.2–5.4)
SODIUM SERPL-SCNC: 141 MMOL/L (ref 136–145)
WBC # SPEC AUTO: 6.2 X10(3)/MCL (ref 4.5–11.5)

## 2020-05-08 ENCOUNTER — HISTORICAL (OUTPATIENT)
Dept: SURGERY | Facility: HOSPITAL | Age: 80
End: 2020-05-08

## 2020-05-08 LAB
ANION GAP SERPL CALC-SCNC: 11 MMOL/L
BUN SERPL-MCNC: >50 MG/DL (ref 8–26)
CHLORIDE SERPL-SCNC: 107 MMOL/L (ref 98–109)
CREAT SERPL-MCNC: 8.2 MG/DL (ref 0.6–1.3)
GLUCOSE SERPL-MCNC: 79 MG/DL (ref 70–105)
HCT VFR BLD CALC: 31 % (ref 38–51)
HGB BLD-MCNC: 10.5 MG/DL (ref 12–17)
POC IONIZED CALCIUM: 1.08 MMOL/L (ref 1.12–1.32)
POC TCO2: 23 MMOL/L (ref 24–29)
POTASSIUM BLD-SCNC: 4.3 MMOL/L (ref 3.5–4.9)
SODIUM BLD-SCNC: 136 MMOL/L (ref 138–146)

## 2020-12-22 ENCOUNTER — HISTORICAL (OUTPATIENT)
Dept: RADIOLOGY | Facility: HOSPITAL | Age: 80
End: 2020-12-22

## 2021-12-27 ENCOUNTER — HISTORICAL (OUTPATIENT)
Dept: RADIOLOGY | Facility: HOSPITAL | Age: 81
End: 2021-12-27

## 2022-01-13 ENCOUNTER — HISTORICAL (OUTPATIENT)
Dept: RADIOLOGY | Facility: HOSPITAL | Age: 82
End: 2022-01-13

## 2022-04-14 ENCOUNTER — HISTORICAL (OUTPATIENT)
Dept: RADIOLOGY | Facility: HOSPITAL | Age: 82
End: 2022-04-14
Payer: MEDICARE

## 2022-04-14 ENCOUNTER — HISTORICAL (OUTPATIENT)
Dept: ADMINISTRATIVE | Facility: HOSPITAL | Age: 82
End: 2022-04-14

## 2022-04-30 NOTE — DISCHARGE SUMMARY
Patient:   Jyoti Ruby            MRN: [id number]            FIN: [id number]               Age:   78 years     Sex:  Female     :  1940   Associated Diagnoses:   None   Author:   Cyril Saavedra NP      see progress note as dc summary

## 2022-04-30 NOTE — ED PROVIDER NOTES
"   Patient:   Jyoti Ruby            MRN: [id number]            FIN: [id number]               Age:   78 years     Sex:  Female     :  1940   Associated Diagnoses:   Chest pain   Author:   Bria JIMENEZ, Kaushik PUENTES      Basic Information   Time seen: Date & time 2019 03:19:00.   History source: Patient.   Arrival mode: Private vehicle.   History limitation: None.   Additional information: Patient's physician(s): Doron JIMENEZ , Angelica Valladares, Chief Complaint from Nursing Triage Note : Chief Complaint   2019 2:42 CST       Chief Complaint           pt complains of L sided chest pain, under L breast for a few hours; pt denies SOB, weakness; pt states same pain occured 2 days ago, resolved on its own; pt denies any cardiac Hx  .      History of Present Illness   The patient presents with   77 y/o AA female with hx of HTN presents to the ED, c/o chest pain. Pt states she has had left inferior chest pain for "a few hours". Pt denies any SOB, belching, or nausea. Pt notes she also has had faster heart rate for 2 days. .  The onset was   "a few hours".  The course/duration of symptoms is constant.  Location: Left anterior inferior chest. Radiating pain: none. The character of symptoms is   "pain".  The degree at onset was moderate.  The degree at maximum was moderate.  The degree at present is moderate.  The exacerbating factor is none.  The relieving factor is none.  Risk factors consist of hypertension.  Prior episodes: none.  Therapy today None.  Associated symptoms: none.        Review of Systems   Constitutional symptoms:  No fever, no chills.    Skin symptoms:  Negative except as documented in HPI.   Respiratory symptoms:  No shortness of breath, no cough, no sputum production.    Cardiovascular symptoms:  Chest pain, left chest, inferior, moderate pain, No palpitations,    Gastrointestinal symptoms:  No abdominal pain, no nausea, no vomiting.    Psychiatric symptoms:  Negative except as " documented in HPI.             Additional review of systems information: All other systems reviewed and otherwise negative.      Health Status   Allergies:    Allergic Reactions (Selected)  No Known Allergies.   Medications:  (Selected)   Prescriptions  Prescribed  EC-Naprosyn 500 mg oral delayed release tablet: 500 mg = 1 tab(s), Oral, BID, # 14 tab(s), 0 Refill(s)  Norco 5 mg-325 mg oral tablet: 1 tab(s), Oral, q6hr, # 10 tab(s), 0 Refill(s)  Skelaxin 800 mg oral tablet: 800 mg = 1 tab(s), Oral, TID, # 12 tab(s), 0 Refill(s)  Toradol 10 mg oral tablet: 10 mg = 1 tab(s), Oral, q4hr, PRN for pain, # 12 tab(s), 0 Refill(s)  Documented Medications  Documented  Onglyza 5 mg oral tablet: 5 mg = 1 tab(s), Oral, Daily, # 90 tab(s), 0 Refill(s)  Vitamin D3: 0 Refill(s)  benzonatate 100 mg oral capsule: 100 mg = 1 cap(s), Oral, TID, # 30 cap(s), 0 Refill(s)  calcitriol 0.5 mcg oral capsule: 0.5 mcg = 1 cap(s), Oral, Daily, 0 Refill(s)  diltiazem 180 mg/24 hours oral TABlet, extended release: 180 mg = 1 tab(s), Oral, Daily, # 90 tab(s), 0 Refill(s)  labetalol 100 mg oral tablet: 150 mg = 1.5 tab(s), Oral, BID, # 180 tab(s), 0 Refill(s).      Past Medical/ Family/ Social History   Medical history:    Active  Arthritis (8636429)  GERD - Gastro-esophageal reflux disease (9090698158)  Hypertension (13722866)  Kidney disease (455412700)  Multiple myeloma ([id number])  Resolved  Diabetes ([id number]):  Resolved..   Surgical history:    Hysterectomy (SNOMED CT 884721302).  back sx.  Appendectomy (ICD-9-CM 47.0)..   Family history:    No family history items have been selected or recorded..   Social history: Alcohol use: Denies, Tobacco use: Denies.      Physical Examination               Vital Signs   Vital Signs   1/28/2019 3:04 CST       Peripheral Pulse Rate     60 bpm                             Heart Rate Monitored      60 bpm  (Modified)                             Respiratory Rate          16 br/min                             SpO2                      97 %                             Oxygen Therapy            Room air                             Systolic Blood Pressure   220 mmHg  HI                             Diastolic Blood Pressure  83 mmHg                             Mean Arterial Pressure, Cuff              129 mmHg    1/28/2019 2:42 CST       Temperature Oral          36.5 DegC                             Temperature Oral (calculated)             97.70 DegF                             Peripheral Pulse Rate     64 bpm                             Respiratory Rate          18 br/min                             SpO2                      100 %                             Oxygen Therapy            Room air                             Systolic Blood Pressure   211 mmHg  HI                             Diastolic Blood Pressure  139 mmHg  HI  .   Measurements   1/28/2019 2:42 CST       Weight Estimated          78 kg                             Height/Length Estimated   152 cm                             Body Mass Index Estimated 33.76 kg/m2  .   Basic Oxygen Information   1/28/2019 3:04 CST       SpO2                      97 %                             Oxygen Therapy            Room air    1/28/2019 2:42 CST       SpO2                      100 %                             Oxygen Therapy            Room air  .   General:  Alert, no acute distress, well appearing, conversant.    Skin:  Warm, dry, intact.    Head:  Normocephalic, atraumatic.    Neck:  Supple, trachea midline.    Eye:  Pupils are equal, round and reactive to light, extraocular movements are intact, normal conjunctiva.    Ears, nose, mouth and throat:  Oral mucosa moist.   Cardiovascular:  Regular rate and rhythm, No murmur, Normal peripheral perfusion, No edema.    Respiratory:  Lungs are clear to auscultation, respirations are non-labored, breath sounds are equal, Symmetrical chest wall expansion.    Chest  wall:  No tenderness, On exam: Left, anterior, inferior, mild, tenderness.    Musculoskeletal:  Normal ROM, normal strength, no tenderness, no swelling, no deformity.    Gastrointestinal:  Soft, Nontender, Non distended.    Neurological:  Alert and oriented to person, place, time, and situation, No focal neurological deficit observed, CN II-XII intact, normal sensory observed, normal motor observed, normal speech observed, normal coordination observed.    Psychiatric:  Cooperative, appropriate mood & affect, normal judgment.       Medical Decision Making   Documents reviewed:  Emergency department nurses' notes.   Orders    Laboratory    Troponin-I, Kaushik Fraser MD, 01/28/19, 03:08, Ordered    CK, Kaushik Fraser MD, 01/28/19, 03:08, Ordered    CK MB, Kaushik Fraser MD, 01/28/19, 03:08, Ordered    CBC w/ Auto Diff, Kaushik Fraser MD, 01/28/19, 03:08, Ordered    CMP, Kaushik Fraser MD, 01/28/19, 03:08, Ordered    Urinalysis with microscopic a reflex to culture, Kaushik Fraser MD, 01/28/19, 03:08, Ordered  Xray    CXR 1 View, Kaushik Fraser MD, 01/28/19, 03:08, Ordered .   Results review:  Lab results : Lab View   1/28/2019 3:15 CST       UA Appear                 CLEAR                             UA Color                  YELLOW                             UA Spec Grav              1.012                             UA Bili                   Negative                             UA pH                     5.0                             UA Urobilinogen           0.2                             UA Blood                  Negative                             UA Glucose                Negative                             UA Ketones                Negative                             UA Protein                1+                             UA Nitrite                Negative                             UA Leuk Est               Negative                             UA WBC                     NONE SEEN                             UA RBC                    NONE SEEN                             UA Bacteria               NONE SEEN /HPF                             UA Squam Epithelial       NONE SEEN    1/28/2019 3:00 CST       Sodium Lvl                140 mmol/L                             Potassium Lvl             4.0 mmol/L                             Chloride                  109 mmol/L  HI                             CO2                       21.0 mmol/L                             Calcium Lvl               8.6 mg/dL                             Glucose Lvl               89 mg/dL                             BUN                       64.0 mg/dL  HI                             Creatinine                3.64 mg/dL  HI                             eGFR-AA                   16 mL/min/1.73 m2  NA                             eGFR-JOANNA                  13 mL/min/1.73 m2  NA                             Bili Total                0.5 mg/dL                             Bili Direct               0.10 mg/dL                             Bili Indirect             0.40 mg/dL                             AST                       9 unit/L  LOW                             ALT                       14 unit/L                             Alk Phos                  76 unit/L                             Total Protein             7.7 gm/dL                             Albumin Lvl               3.80 gm/dL                             Globulin                  3.90 gm/dL  HI                             A/G Ratio                 1.0  NA                             Total CK                  108 unit/L                             CK MB                     1.9 ng/mL                             Troponin-I                <0.02 ng/mL                             WBC                       4.8 x10(3)/mcL                             RBC                       3.42 x10(6)/mcL  LOW                             Hgb                       9.1  gm/dL  LOW                             Hct                       29.2 %  LOW                             Platelet                  191 x10(3)/mcL                             MCV                       85.4 fL                             MCH                       26.6 pg  LOW                             MCHC                      31.2 gm/dL  LOW                             RDW                       16.1 %                             MPV                       11.0 fL                             Abs Neut                  2.56 x10(3)/mcL                             Neutro Auto               53 %  NA                             Lymph Auto                31 %  NA                             Mono Auto                 13 %  NA                             Eos Auto                  2 %  NA                             Abs Eos                   0.1 x10(3)/mcL                             Basophil Auto             1 %  NA                             Abs Neutro                2.56 x10(3)/mcL                             Abs Lymph                 1.5 x10(3)/mcL                             Abs Mono                  0.6 x10(3)/mcL                             Abs Baso                  0.0 x10(3)/mcL  .      Reexamination/ Reevaluation   Time: 1/28/2019 06:00:00 .   Vital signs   results included from flowsheet : Vital Signs   1/28/2019 5:38 CST       Peripheral Pulse Rate     61 bpm                             Heart Rate Monitored      64 bpm                             Respiratory Rate          18 br/min                             SpO2                      97 %                             Oxygen Therapy            Room air                             Systolic Blood Pressure   154 mmHg  HI                             Diastolic Blood Pressure  70 mmHg                             Mean Arterial Pressure, Cuff              98 mmHg     Course: improving.   Pain status: decreased, improved with ntg  .      Impression and Plan   Diagnosis    Chest pain (AHX69-MD R07.9)      Calls-Consults   -  1/28/2019 05:58:00 , CIS paged.    Plan   Condition: Stable.    Disposition: Place in Observation Telemetry Unit.    Counseled: Patient, Family, Regarding diagnosis, Regarding diagnostic results, Regarding treatment plan, Patient indicated understanding of instructions.    Notes: I, Cayden Womack, acted solely as a scribe for and in the presence of Dr. Fraser who performed the service..       Addendum      Teaching-Supervisory Addendum-Brief   Notes: I, Dr. Fraser, personally performed the services described in this documentation as scribed in my presence and it is both accurate and complete..

## 2022-04-30 NOTE — OP NOTE
DATE OF SURGERY:    05/08/2020    SURGEON:  Cate Bass MD  ASSISTANT:  CHIN Bojorquez    DIAGNOSIS:    1. End stage renal disease.    2. Mechanical complication of left upper arm brachiocephalic fistula.    PROCEDURE PERFORMED:  Left arm fistula revision with segmental resection, reconstruction, branch ligation, and superficialization.    HISTORY OF PRESENT ILLNESS:  Mrs. Jyoti Ruby is a 80-year-old woman with end-stage renal disease, who has a previously created left brachiocephalic fistula.   While the most proximal segment of the fistula has achieved a good size and depth, the remainder of the fistula is excessively deep for cannulation and also has tortuosity.  She presents today for revision of her fistula.    DESCRIPTION OF PROCEDURE:  Mrs. Ruby was taken to the operating room after receiving a regional block to her left arm.  Left arm was prepped and draped in the usual sterile fashion.  Appropriate timeout was performed.  Antibiotics were administered.  B-mode ultrasound was utilized to identify the course of the cephalic vein in the upper arm.  It was indeed deep and tortuous in her upper arm.  A long incision approximately 18 cm in length was made over the course of the cephalic vein.  We were able to isolate the vein and mobilize it,  and we ligated multiple branches utilizing 3-0 silk ties and surgical clips.  After fully mobilizing the vein, it did have severe tortuosity that needed to be resolved prior to superficialization.  We resected an approximately 4-5 cm segment of tortuous vein and then performed end-to-end anastomosis utilizing interrupted 6-0 Prolene sutures.  We gave heparin prior to this maneuver, and after restoring flow, we gave protamine.  There was good thrill to the fistula, and we were able to achieve good hemostasis.  The anastomosis did not have significant tension at completion.  3-0 Vicryl suture was utilized to reapproximate the deeper  subcutaneous tissues to elevate the floor of the wound and superficialize the fistula.  We then utilized a 4-0 Monocryl to reapproximate the skin.  Dermabond Prineo     dressing was placed.  Pippa Quinones assisted throughout.  She has assisted with vessel exposure, branch ligation.  She also performed skin closure.  She assisted with our anastomosis as well.      ______________________________  MD GILL Veloz/AMITA  DD:  05/08/2020  Time:  10:35AM  DT:  05/08/2020  Time:  10:45AM  Job #:  [id number]

## 2022-04-30 NOTE — OP NOTE
Patient:   Jyoti Ruby            MRN: [id number]            FIN: [id number]               Age:   79 years     Sex:  Female     :  1940   Associated Diagnoses:   Chronic kidney disease, stage 5   Author:   Cate Bass MD      Operative Note   Operative Information   Date/ Time:  2020 13:35:00.     Procedures Performed: Left brachiocephalic fistula   .     Indications: Mrs. Ruby is a 78 y/o woman with CKD who needs long term hemodialysis access creation.   Preoperative mapping suggests a suitable left upper arm cephalic vein..     Preoperative Diagnosis: Chronic kidney disease, stage 5 (VHJ42-SM N18.5).     Postoperative Diagnosis: Chronic kidney disease, stage 5 (CTC08-HH N18.5).     Surgeon: Cate Bass MD.     Assistant: Pippa Del Real.     Speciman Removed: none   .     Esimated blood loss: loss less than  100  cc.     Description of Procedure/Findings/    Complications: The risks and benefits of the procedure were discussed with the patient prior to the procedure and the patient desires to proceed.   Her left arm was prepped in the usual sterile fashion.  Cefzol was administered prior to the incision.  An appropriate time out was performed.  Ultrasound was utilized to identify the cephalic vein in the upper arm.  It was patent throughout and appropriate in size in the upper arm howevere it was deep and had tortuosity.   A curvilinear incision was made over the brachial artery and the cephalic vein just proximal to the antecubital fossa.   Dissection was performed through the superficial soft tissues and then followed the appropriate plane laterally and medially to identify the cephalic vein and brachial artery.  Each of these vessels were isolated and side branches were ligated with 3-0 silk when appropriate.  The cephalic vein was ligated distally with a 3-0 silk. The brachial artery had no disease and appeared appropriate for fistula creation.  The cephalic  vein easily accepted a 4mm dilator and demonstrated a thrill with heparin saline injection.  4000 units of heparin was given IV.  A longitudinal incision was made on the brachial artery.  The vein was spatulated appropriately.  Anastamosis was created with a 6-0 prolene suture. Hemostasis was obtained and the soft tissue was dissected to allow for appropriate lay of the vein graft.  3-0 vicryl was utilized to close the subcutaneous structures and a 4-0 monocryl was utlilzed to close the skin. Dermabond was used to dress the wound. This completed the procedure.      I expect that she will likely need later revision given the depth of the vessel.    Pippa Quinones assisted throughout. She assisted with exposure of the artery and the vein.  She assisted with vessel anastamosis as well.  She performed skin closure.     .     Findings:    ,    .     Complications: None.

## 2023-08-09 ENCOUNTER — TELEPHONE (OUTPATIENT)
Dept: CARDIOLOGY | Facility: HOSPITAL | Age: 83
End: 2023-08-09
Payer: MEDICARE

## 2023-08-09 RX ORDER — SODIUM CHLORIDE 0.9 % (FLUSH) 0.9 %
10 SYRINGE (ML) INJECTION
Status: CANCELLED | OUTPATIENT
Start: 2023-08-09

## 2023-08-09 NOTE — TELEPHONE ENCOUNTER
Tracy with [facility] called reporting access dysfucntion on Ms. Ruby. She is having increased Venous pressures and some prolonged bleeding issues. Contacted the patient-no answer. Contacted the patients daughter-no answer. Called center back and gave them date/time for procedure next week. Requested they review the pre op instrucitons with patient (faxed to them). Erin stated agreement. [place]

## 2023-08-14 ENCOUNTER — HOSPITAL ENCOUNTER (OUTPATIENT)
Facility: HOSPITAL | Age: 83
Discharge: HOME | End: 2023-08-14
Attending: STUDENT IN AN ORGANIZED HEALTH CARE EDUCATION/TRAINING PROGRAM | Admitting: STUDENT IN AN ORGANIZED HEALTH CARE EDUCATION/TRAINING PROGRAM
Payer: MEDICARE

## 2023-08-14 VITALS
RESPIRATION RATE: 19 BRPM | HEIGHT: 60 IN | WEIGHT: 188.5 LBS | BODY MASS INDEX: 37.01 KG/M2 | HEART RATE: 55 BPM | TEMPERATURE: 98 F | DIASTOLIC BLOOD PRESSURE: 76 MMHG | SYSTOLIC BLOOD PRESSURE: 168 MMHG | OXYGEN SATURATION: 99 %

## 2023-08-14 DIAGNOSIS — T82.590A MECHANICAL COMPLICATION OF ARTERIOVENOUS FISTULA SURGICALLY CREATED, INITIAL ENCOUNTER: ICD-10-CM

## 2023-08-14 PROCEDURE — 36902 INTRO CATH DIALYSIS CIRCUIT: CPT | Mod: LT | Performed by: STUDENT IN AN ORGANIZED HEALTH CARE EDUCATION/TRAINING PROGRAM

## 2023-08-14 PROCEDURE — 25500020 PHARM REV CODE 255: Performed by: STUDENT IN AN ORGANIZED HEALTH CARE EDUCATION/TRAINING PROGRAM

## 2023-08-14 PROCEDURE — 25000003 PHARM REV CODE 250: Performed by: STUDENT IN AN ORGANIZED HEALTH CARE EDUCATION/TRAINING PROGRAM

## 2023-08-14 PROCEDURE — 63600175 PHARM REV CODE 636 W HCPCS: Performed by: STUDENT IN AN ORGANIZED HEALTH CARE EDUCATION/TRAINING PROGRAM

## 2023-08-14 PROCEDURE — 99152 MOD SED SAME PHYS/QHP 5/>YRS: CPT | Performed by: STUDENT IN AN ORGANIZED HEALTH CARE EDUCATION/TRAINING PROGRAM

## 2023-08-14 PROCEDURE — 99152 MOD SED SAME PHYS/QHP 5/>YRS: CPT | Mod: ,,, | Performed by: STUDENT IN AN ORGANIZED HEALTH CARE EDUCATION/TRAINING PROGRAM

## 2023-08-14 PROCEDURE — 99213 OFFICE O/P EST LOW 20 MIN: CPT | Mod: 25,,, | Performed by: STUDENT IN AN ORGANIZED HEALTH CARE EDUCATION/TRAINING PROGRAM

## 2023-08-14 PROCEDURE — C1769 GUIDE WIRE: HCPCS | Performed by: STUDENT IN AN ORGANIZED HEALTH CARE EDUCATION/TRAINING PROGRAM

## 2023-08-14 PROCEDURE — C1894 INTRO/SHEATH, NON-LASER: HCPCS | Performed by: STUDENT IN AN ORGANIZED HEALTH CARE EDUCATION/TRAINING PROGRAM

## 2023-08-14 PROCEDURE — 36902 PR INTRO CATH, DIALYSIS CIRCUIT W/TRANSLML BALLOON ANGIO: ICD-10-PCS | Mod: LT,,, | Performed by: STUDENT IN AN ORGANIZED HEALTH CARE EDUCATION/TRAINING PROGRAM

## 2023-08-14 PROCEDURE — 36902 INTRO CATH DIALYSIS CIRCUIT: CPT | Mod: LT,,, | Performed by: STUDENT IN AN ORGANIZED HEALTH CARE EDUCATION/TRAINING PROGRAM

## 2023-08-14 PROCEDURE — 27201423 OPTIME MED/SURG SUP & DEVICES STERILE SUPPLY: Performed by: STUDENT IN AN ORGANIZED HEALTH CARE EDUCATION/TRAINING PROGRAM

## 2023-08-14 PROCEDURE — 99213 PR OFFICE/OUTPT VISIT, EST, LEVL III, 20-29 MIN: ICD-10-PCS | Mod: 25,,, | Performed by: STUDENT IN AN ORGANIZED HEALTH CARE EDUCATION/TRAINING PROGRAM

## 2023-08-14 PROCEDURE — C1725 CATH, TRANSLUMIN NON-LASER: HCPCS | Performed by: STUDENT IN AN ORGANIZED HEALTH CARE EDUCATION/TRAINING PROGRAM

## 2023-08-14 PROCEDURE — 99152 PR MOD CONSCIOUS SEDATION, SAME PHYS, 5+ YRS, FIRST 15 MIN: ICD-10-PCS | Mod: ,,, | Performed by: STUDENT IN AN ORGANIZED HEALTH CARE EDUCATION/TRAINING PROGRAM

## 2023-08-14 RX ORDER — LABETALOL 200 MG/1
200 TABLET, FILM COATED ORAL 2 TIMES DAILY
COMMUNITY

## 2023-08-14 RX ORDER — FUROSEMIDE 20 MG/1
20 TABLET ORAL DAILY PRN
COMMUNITY

## 2023-08-14 RX ORDER — SEVELAMER CARBONATE 800 MG/1
1600 TABLET, FILM COATED ORAL
COMMUNITY

## 2023-08-14 RX ORDER — AMLODIPINE BESYLATE 5 MG/1
5 TABLET ORAL DAILY
COMMUNITY

## 2023-08-14 RX ORDER — SODIUM BICARBONATE 650 MG/1
650 TABLET ORAL 2 TIMES DAILY
COMMUNITY

## 2023-08-14 RX ORDER — MIDAZOLAM HYDROCHLORIDE 1 MG/ML
INJECTION INTRAMUSCULAR; INTRAVENOUS
Status: DISCONTINUED | OUTPATIENT
Start: 2023-08-14 | End: 2023-08-14 | Stop reason: HOSPADM

## 2023-08-14 RX ORDER — FOLIC ACID-PYRIDOXINE-CYANOCOBALAMIN TAB 2.5-25-2 MG 2.5-25-2 MG
1 TAB ORAL DAILY
COMMUNITY

## 2023-08-14 RX ORDER — LOSARTAN POTASSIUM 100 MG/1
100 TABLET ORAL 2 TIMES DAILY
COMMUNITY

## 2023-08-14 RX ORDER — LIDOCAINE HYDROCHLORIDE 10 MG/ML
INJECTION INFILTRATION; PERINEURAL
Status: DISCONTINUED | OUTPATIENT
Start: 2023-08-14 | End: 2023-08-14 | Stop reason: HOSPADM

## 2023-08-14 RX ORDER — FENTANYL CITRATE 50 UG/ML
INJECTION, SOLUTION INTRAMUSCULAR; INTRAVENOUS
Status: DISCONTINUED | OUTPATIENT
Start: 2023-08-14 | End: 2023-08-14 | Stop reason: HOSPADM

## 2023-08-14 RX ORDER — CALCITRIOL 0.25 UG/1
0.25 CAPSULE ORAL DAILY
COMMUNITY

## 2023-08-14 NOTE — H&P
INTERVENTIONAL NEPHROLOGY HISTORY & PHYSICAL       Patient Name: Jyoti Mullinsfreeman SILVESTRE 1940    Date: 2023  Time: 10:57 AM         HPI: 83 y.o. female with ESRD on HD via left brachiocephalic arteriovenous fistula who presents with increasing venous pressures and one episode of prolonged bleeding. Pt has noticed this problem over the past 1-2 weeks. Pt is being prepared for fistulogram with possible intervention today.    [facility] Family is present. Risks and benefits of fistulogram with possible intervention and intravenous conscious sedation was reviewed with the patient. The patient agrees to proceed with the intended procedure. Consents for both intravenous conscious sedation and procedure were signed and placed within the chart.       Review of Systems:  General:  No fatigue  Skin: No rashes  HEENT: No vision changes  CVS: No CP  RS: No SOB  GIT: No abdominal pain  Extremities: No swelling  Neurological:  No focal weakness  Psych: No depression    Past Medical History:   Diagnosis Date    Dialysis complication     Hypertension     Obesity, unspecified       History reviewed. No pertinent surgical history.   Review of patient's allergies indicates:  No Known Allergies   Social History     Tobacco Use    Smoking status: Never    Smokeless tobacco: Never   Substance Use Topics    Alcohol use: Not Currently    Drug use: Never      History reviewed. No pertinent family history.    No current facility-administered medications for this encounter.    Vital Signs:  Temp:  [98.3 °F (36.8 °C)] 98.3 °F (36.8 °C)  Pulse:  [56-76] 56  SpO2:  [95 %] 95 %  BP: (197-227)/(73-85) 197/73     Physical Exam:  General: NAD  HEENT: NC/AT, EOMI  CVS: RRR.  RS: breathing easily.  Abdominal: Soft, NT/ND.  Extremities: No edema b/l LE  Skin: No rash, no lesions.  Neurological: No focal deficits.  Psych: Normal affect  Dialysis Access: left brachiocephalic arteriovenous fistula  with pulsatility in the middle fistula onward. Light thrill.    Results:    Lab Results   Component Value Date     (L) 07/13/2022    K 4.2 07/13/2022    CL 93 (L) 07/13/2022    CO2 25 07/13/2022    BUN 65 (H) 07/13/2022    CREATININE 7.78 (H) 07/13/2022     Lab Results   Component Value Date    WBC 6.2 05/05/2020    HGB 10.9 (L) 05/05/2020     05/05/2020    MCV 93.1 05/05/2020       Assessment and Plan:      ESRD on HD via left brachiocephalic arteriovenous fistula.  Mechanical complication of AVF.  Pt with ESRD on HD via left brachiocephalic arteriovenous fistula who presents today with increased venous pressures and one episode of prolonged bleeding. The pt is being prepared for fistulogram with possible intervention today.  - Consents obtained and placed within chart.  - Will proceed in cath lab setting today.    Please feel free to reach me with any questions.    Deshawn Rangel,   Interventional Nephrology  Cell: [phone]

## 2023-08-14 NOTE — Clinical Note
The balloon was inflated with indeflator in the  . The balloon max pressure was 14 yue for 26 seconds

## 2023-08-14 NOTE — Clinical Note
The balloon was inflated with indeflator in the  . The balloon max pressure was 12 yue for 65 seconds

## 2023-08-14 NOTE — PROCEDURES
INTERVENTIONAL NEPHROLOGY PROCEDURE NOTE: FISTULOGRAM/GRAFTOGRAM         Patient Name: Jyoti Ruby  KONRAD 1940    Procedure Date:    2023    Performing Physician:   Dr. Rangel    Access History: Pt is with ESRD on HD typically via left brachiocephalic arteriovenous fistula who presents today with increased venous pressures and recent prolonged bleeding event.    Pre-Op Diagnosis: T82.590A Mechanical complication of surgically created arteriovenous shunt, initial encounter, N18.6 End Stage Renal Disease (ESRD)  Post-Op Diagnosis: Same    Procedure: Fistulogram with possible angioplasty and stent placement.    Indication: Nonfunctional/Dysfunctional/Malfunctioning HD access.    Informed Consent:  The patient was evaluated in the pre-operative area with assessment including the American Society of Anesthesia risk classification. The procedure is discussed in detail including risks, benefits alternatives and options and the patient agrees to proceed. Informed consent was obtained from the patient.     Maximum sterile barrier technique: The patient was prepped and draped using chlorhexidine prep and maximum sterile barrier technique.    Sedation Note:  Risks and benefits of sedation were reviewed with the patient or surrogate, including bleeding, infection, nausea, vomiting, dizziness, instability, damage to a nerve, damage to a blood vessel, cellulitis, reaction to medications, amnesia, loss of consciousness, respiratory arrest, cardiac arrest.     The patient received the following medications: Versed 1 mg IV and Fentanyl 75 mcg IV; patient did remain alert, responsive, and conversational throughout the procedure. I was personally responsible for supervising the administration of moderate sedation services during the procedure performed and I affirm all the guidelines and requirements described in the CPT 2023 section on moderate sedation were followed, including the use of an independent trained  observer who had no other duties during the procedure. The total face-to-face time was 15 minutes.    Procedure Steps:     The patient was prepped and draped in sterile fashion. Procedure ultrasound revealed patent vascular HD access.    Local anesthesia was administered by injecting 1% lidocaine at the intended site of cannulation. With use of live ultrasonographic visualization, the vascular access was successfully cannulated with a mini-stick needle aimed towards the outflow. After blood flashback was noted, the mini-stick wire was advanced through the needle. Via Seldinger technique, the needle was exchanged for the mini-stick sheath. Angiograms were completed which revealed 1 lesion(s) (summarized below). A 150 cm glide wire was advanced through the mini-stick sheath with the tip parked in the cephalic arch. A 6 Fr sheath was exchanged via Seldinger technique for the mini-stick sheath.    The aforementioned lesion(s) are as below, followed by their respective intervention(s) :  #1: Approximately 95% stenosis in the cephalic arch.  Angioplasty was performed at this location using a [employer]  7 mm x 40 mm balloon. Approximately 100% effacement was obtained at 10-15 SOHAIL and was held for 1-5 seconds. Post-angioplasty angiogram revealed 15% residual stenosis. Pt was moderately symptomatic.  * Retrograde angiogram showed patent inflow segment without notable lesions.    Lesions were treated in the following order: #1.    Wire and sheath were removed and hemostasis was achieved using a purse-string stitch and light digital pressure at the site of cannulation.    ASSESSMENT/PLAN:  - Successful angioplasty of the cephalic arch.    EBL: 5 ml    Contrast: 24 ml    Complications: None    Post-op Instructions: The patient was given both verbal and written post-op instructions. If excessive bleeding at the site, they have been instructed to call their physician or proceed to a local emergency room.    Orders  to the dialysis unit: OK to use access for dialysis needs.    Thank you for allowing me the opportunity in taking care of this patient. Please reach me with any questions.    Deshawn Rangel DO  Interventional Nephrology  Cell: [phone]

## 2023-08-14 NOTE — DISCHARGE SUMMARY
INTERVENTIONAL NEPHROLOGY DISCHARGE SUMMARY         Patient Name: Jyoti Ruby   1940    Procedure Date: 2023      In brief, Ms. Ruby underwent fistulogram of her L BC AVF 2/2 increased venous pressures and prolonged bleeding. Angiogram revealed a severe, nearly occlusive, stenosis at the cephalic arch. There was a small accessory branch located here as well that would be unable to supply the necessary flow. Contrast moved sluggishly through the fistula. We intervened with a low-pressure/high-compliance 7 mm balloon as to ensure no rupture of the cephalic arch. The pt was moderately symptomatic. Our results were good, though not perfect due to this. There was also fear of rupture of the cephalic arch.    Given the significant improvement, it was determined that the best course of action was to conservatively treat this area. If there continues to be significant issues with the fistula at dialysis, we will have to intervene with likely a high-pressure/low-compliance 7 or 8 mm balloon with possible stenting.    Pre-Op Diagnosis: T82.590A Mechanical complication of surgically created arteriovenous shunt, initial encounter, N18.6 End Stage Renal Disease (ESRD)  Post-Op Diagnosis: Same.    Discharge Instructions/Recommendations:  - Continue use of fistula.  - Pt may be discharged after successful monitoring in post-op area.  - Pt may resume home medications.    Thank you for allowing me the opportunity in taking care of this patient. Please reach me with any questions.    Deshawn Rangel DO  Interventional Nephrology  Cell: [phone]

## 2023-09-25 ENCOUNTER — HOSPITAL ENCOUNTER (OUTPATIENT)
Dept: RADIOLOGY | Facility: HOSPITAL | Age: 83
Discharge: HOME | End: 2023-09-25
Attending: INTERNAL MEDICINE
Payer: MEDICARE

## 2023-09-25 DIAGNOSIS — Z12.31 BREAST CANCER SCREENING BY MAMMOGRAM: ICD-10-CM

## 2023-09-25 PROCEDURE — 77063 MAMMO DIGITAL SCREENING BILAT WITH TOMO: ICD-10-PCS | Mod: 26,,, | Performed by: STUDENT IN AN ORGANIZED HEALTH CARE EDUCATION/TRAINING PROGRAM

## 2023-09-25 PROCEDURE — 77067 SCR MAMMO BI INCL CAD: CPT | Mod: TC

## 2023-09-25 PROCEDURE — 77067 SCR MAMMO BI INCL CAD: CPT | Mod: 26,,, | Performed by: STUDENT IN AN ORGANIZED HEALTH CARE EDUCATION/TRAINING PROGRAM

## 2023-09-25 PROCEDURE — 77063 BREAST TOMOSYNTHESIS BI: CPT | Mod: 26,,, | Performed by: STUDENT IN AN ORGANIZED HEALTH CARE EDUCATION/TRAINING PROGRAM

## 2023-09-25 PROCEDURE — 77067 MAMMO DIGITAL SCREENING BILAT WITH TOMO: ICD-10-PCS | Mod: 26,,, | Performed by: STUDENT IN AN ORGANIZED HEALTH CARE EDUCATION/TRAINING PROGRAM

## 2023-12-28 ENCOUNTER — TELEPHONE (OUTPATIENT)
Dept: CARDIOLOGY | Facility: HOSPITAL | Age: 83
End: 2023-12-28
Payer: MEDICARE

## 2023-12-28 NOTE — TELEPHONE ENCOUNTER
Loida at HD center called the office to report issues with increased venous pressures and pulling clots for treatment today. They had to cut treatment short due to this issue. Most recent clearance is 1.9. She is s/p fistulogram in August s/t increased venous pressures and a severe/nearly occlusive stenosis at the cephalic arch was seen on angiogram. Due to the upcoming holidays, scheduled her for tomorrow to ensure she gets proper HD treatment this weekend. Labs will be drawn prior to procedure. -ZT

## 2023-12-29 ENCOUNTER — HOSPITAL ENCOUNTER (OUTPATIENT)
Facility: HOSPITAL | Age: 83
Discharge: HOME | End: 2023-12-29
Attending: SPECIALIST | Admitting: SPECIALIST
Payer: MEDICARE

## 2023-12-29 VITALS
WEIGHT: 183.44 LBS | SYSTOLIC BLOOD PRESSURE: 167 MMHG | HEIGHT: 60 IN | BODY MASS INDEX: 36.02 KG/M2 | DIASTOLIC BLOOD PRESSURE: 64 MMHG | TEMPERATURE: 98 F | OXYGEN SATURATION: 97 % | HEART RATE: 64 BPM

## 2023-12-29 DIAGNOSIS — T82.590A MECHANICAL COMPLICATION OF ARTERIOVENOUS FISTULA SURGICALLY CREATED, INITIAL ENCOUNTER: ICD-10-CM

## 2023-12-29 LAB
ANION GAP SERPL CALC-SCNC: 19 MEQ/L
BUN SERPL-MCNC: 125 MG/DL (ref 9.8–20.1)
CALCIUM SERPL-MCNC: 7.7 MG/DL (ref 8.4–10.2)
CHLORIDE SERPL-SCNC: 105 MMOL/L (ref 98–107)
CO2 SERPL-SCNC: 14 MMOL/L (ref 23–31)
CREAT SERPL-MCNC: 17.93 MG/DL (ref 0.55–1.02)
CREAT/UREA NIT SERPL: 7
GFR SERPLBLD CREATININE-BSD FMLA CKD-EPI: 2 MLS/MIN/1.73/M2
GLUCOSE SERPL-MCNC: 85 MG/DL (ref 82–115)
POTASSIUM SERPL-SCNC: 5.7 MMOL/L (ref 3.5–5.1)
SODIUM SERPL-SCNC: 138 MMOL/L (ref 136–145)

## 2023-12-29 PROCEDURE — C1725 CATH, TRANSLUMIN NON-LASER: HCPCS | Performed by: SPECIALIST

## 2023-12-29 PROCEDURE — 36902 INTRO CATH DIALYSIS CIRCUIT: CPT | Mod: LT,,, | Performed by: SPECIALIST

## 2023-12-29 PROCEDURE — 99153 MOD SED SAME PHYS/QHP EA: CPT | Performed by: SPECIALIST

## 2023-12-29 PROCEDURE — 27201423 OPTIME MED/SURG SUP & DEVICES STERILE SUPPLY: Performed by: SPECIALIST

## 2023-12-29 PROCEDURE — C1894 INTRO/SHEATH, NON-LASER: HCPCS | Performed by: SPECIALIST

## 2023-12-29 PROCEDURE — 63600175 PHARM REV CODE 636 W HCPCS: Performed by: SPECIALIST

## 2023-12-29 PROCEDURE — C1769 GUIDE WIRE: HCPCS | Performed by: SPECIALIST

## 2023-12-29 PROCEDURE — 36907 BALO ANGIOP CTR DIALYSIS SEG: CPT | Mod: LT | Performed by: SPECIALIST

## 2023-12-29 PROCEDURE — 99152 MOD SED SAME PHYS/QHP 5/>YRS: CPT | Performed by: SPECIALIST

## 2023-12-29 PROCEDURE — 80048 BASIC METABOLIC PNL TOTAL CA: CPT | Performed by: SPECIALIST

## 2023-12-29 PROCEDURE — 36907 BALO ANGIOP CTR DIALYSIS SEG: CPT | Mod: LT,,, | Performed by: SPECIALIST

## 2023-12-29 PROCEDURE — 25000003 PHARM REV CODE 250: Performed by: SPECIALIST

## 2023-12-29 PROCEDURE — 36902 INTRO CATH DIALYSIS CIRCUIT: CPT | Mod: LT | Performed by: SPECIALIST

## 2023-12-29 PROCEDURE — 25500020 PHARM REV CODE 255: Performed by: SPECIALIST

## 2023-12-29 PROCEDURE — 63600175 PHARM REV CODE 636 W HCPCS

## 2023-12-29 PROCEDURE — 99222 1ST HOSP IP/OBS MODERATE 55: CPT | Mod: AI,25,, | Performed by: SPECIALIST

## 2023-12-29 RX ORDER — SODIUM CHLORIDE 0.9 % (FLUSH) 0.9 %
10 SYRINGE (ML) INJECTION
Status: DISCONTINUED | OUTPATIENT
Start: 2023-12-29 | End: 2023-12-29 | Stop reason: HOSPADM

## 2023-12-29 RX ORDER — ONDANSETRON 2 MG/ML
INJECTION INTRAMUSCULAR; INTRAVENOUS
Status: COMPLETED
Start: 2023-12-29 | End: 2023-12-29

## 2023-12-29 RX ORDER — FENTANYL CITRATE 50 UG/ML
INJECTION, SOLUTION INTRAMUSCULAR; INTRAVENOUS
Status: DISCONTINUED | OUTPATIENT
Start: 2023-12-29 | End: 2023-12-29 | Stop reason: HOSPADM

## 2023-12-29 RX ORDER — HEPARIN SODIUM 1000 [USP'U]/ML
INJECTION, SOLUTION INTRAVENOUS; SUBCUTANEOUS
Status: DISCONTINUED | OUTPATIENT
Start: 2023-12-29 | End: 2023-12-29 | Stop reason: HOSPADM

## 2023-12-29 RX ORDER — LIDOCAINE HYDROCHLORIDE 10 MG/ML
INJECTION INFILTRATION; PERINEURAL
Status: DISCONTINUED | OUTPATIENT
Start: 2023-12-29 | End: 2023-12-29 | Stop reason: HOSPADM

## 2023-12-29 RX ORDER — MIDAZOLAM HYDROCHLORIDE 1 MG/ML
INJECTION INTRAMUSCULAR; INTRAVENOUS
Status: DISCONTINUED | OUTPATIENT
Start: 2023-12-29 | End: 2023-12-29 | Stop reason: HOSPADM

## 2023-12-29 RX ORDER — HYDROCODONE BITARTRATE AND ACETAMINOPHEN 5; 325 MG/1; MG/1
1 TABLET ORAL EVERY 4 HOURS PRN
Status: CANCELLED | OUTPATIENT
Start: 2023-12-29

## 2023-12-29 RX ADMIN — ONDANSETRON 4 MG: 2 INJECTION INTRAMUSCULAR; INTRAVENOUS at 12:12

## 2023-12-29 NOTE — Clinical Note
The balloon was inflated with indeflator in the  . The balloon max pressure was 12 yue for 25 seconds

## 2023-12-29 NOTE — OP NOTE
[facility] - Cath Lab Services  General Surgery  Operative Note    SUMMARY     Date of Procedure: 12/29/2023     Procedure:  Fistulogram with balloon angioplasty of cephalic arch and left innominate vein    Surgeon(s) and Role:     * Cate Bass MD - Primary    Assisting Surgeon: None    Pre-Operative Diagnosis: Mechanical complication of arteriovenous fistula surgically created, initial encounter [T82.590A]    Post-Operative Diagnosis: Post-Op Diagnosis Codes:     * Mechanical complication of arteriovenous fistula surgically created, initial encounter [T82.590A]    Anesthesia: RN IV Sedation    Operative Findings (including complications, if any):  The left arm brachiocephalic fistula was patent with tortuosity in the upper arm and some chronic thrombus but a good lumen throughout the upper arm.  There was an aberrant cephalic arch outflow traveling through the thoracic outlet.  There was a stenosis at this location did respond to angioplasty.  Additional angioplasty was also required in the left innominate vein.  There was much improved transit of contrast improvement in level of pulsatility at case completion.  Chronic thrombus and abnormal cephalic arch outflow issues all as early abandonment of this fistula.  If recurrent issues arise would recommend ligation and new access creation.    Description of Technical Procedures:  Mrs. Ruby was taken to the fluoroscopy suite and placed in a supine position where the left arm was prepped and draped in the usual sterile fashion.  B-mode ultrasound was utilized to evaluate the fistula there was some chronic thrombus with an intact lumen.  Images were retained.  A site was selected in the proximal fistula and 1% lidocaine was injected.  18 gauge needle and a J-wire were used to access the fistula and an 8 Fr sheath was placed.  Fistulogram was performed.  The superior vena cava and left innominate veins were patent.  There was a high-grade  stenosis with near complete occlusion within an aberrant cephalic arch outflow near the subclavian vein.  Contrast transit was very slow and took 10-15 seconds to reach this location after injection.  The fistula within the upper arm was patent with some mild tortuosity.  No significant stenosis was appreciated.  Sensation was performed and a wire was placed across the stenosis in the aberrant cephalic arch outflow.  Balloon angioplasty was performed with a 7 mm x 6 cm balloon.  We did have to reach high pressures to achieve good balloon effacement.  Subsequent angiography did show improvement much improved transit of contrast and reduction in level of stenosis.  There was some suggestion of some residual stenosis and repeat angioplasty 7 mm balloon and then an 8 mm subsequent angiography suggested a stenosis in the innominate vein and this was also angioplastied.  There was still some residual angiographic narrowing at that location at completion however there was no sluggishness to contrast transit and there was much improved pulsatility to the fistula.  There was no evidence of extravasation or hemorrhage.  Doubt any other symptoms.  4-0 Monocryl was placed about the sheath site the sheath this completed our procedure.    Significant Surgical Tasks Conducted by the Assistant(s), if Applicable: none    Estimated Blood Loss (EBL): * No values recorded between 12/29/2023 11:41 AM and 12/29/2023 12:24 PM *           Implants: * No implants in log *    Specimens:   Specimen (24h ago, onward)      None                    Condition: Good    Disposition: PACU - hemodynamically stable.    Attestation: I was present and scrubbed for the entire procedure.

## 2023-12-29 NOTE — DISCHARGE SUMMARY
[facility] - Cath Lab Services  Discharge Note  Short Stay    Procedure(s) (LRB):  Fistulogram (Left)      OUTCOME: Patient tolerated treatment/procedure well without complication and is now ready for discharge.    DISPOSITION: Home or Self Care    FINAL DIAGNOSIS:  Mechanical complication of arteriovenous fistula surgically created    FOLLOWUP: In clinic    DISCHARGE INSTRUCTIONS:    Discharge Procedure Orders   Diet general     Keep surgical extremity elevated     Wound care routine (specify)   Order Comments: Leave dermabond in place until it falls off;  Ok to wash with soap under running water but do not submerge.  Do not use antibiotics ointment.     Call MD for:  temperature >100.4     Call MD for:  redness, tenderness, or signs of infection (pain, swelling, redness, odor or green/yellow discharge around incision site)     Activity as tolerated        TIME SPENT ON DISCHARGE: 5 minutes

## 2023-12-29 NOTE — Clinical Note
The balloon was inflated with indeflator in the  . The balloon max pressure was 8 yue for 14 seconds

## 2023-12-29 NOTE — H&P
[facility] - Cath Lab Services  History & Physical    SUBJECTIVE:     Chief Complaint/Reason for Admission:  high venous pressures  and aspiration of clot from fistula    History of Present Illness:   Mrs. Ruby is an 83-year-old woman with stage renal disease who is utilizing a brachiocephalic fistula for hemodialysis.  Elevated venous pressures and some aspiration of clot during they did have to discontinue treatment recently secondary to these issues.  She does have a history of venous outflow problems specifically at the cephalic arch.  She did undergo angioplasty of that location in August of this year.    She denies any recent fevers, hospitalizations, medication changes or surgeries.    She denies prolonged bleeding from her access.  Recent clearances 1.9.      PTA Medications   Medication Sig    amLODIPine (NORVASC) 5 MG tablet Take 5 mg by mouth once daily.    calcitRIOL (ROCALTROL) 0.25 MCG Cap Take 0.25 mcg by mouth once daily.    folic acid-vit B6-vit B12 2.5-25-2 mg (FOLBIC) 2.5-25-2 mg Tab Take 1 tablet by mouth once daily.    furosemide (LASIX) 20 MG tablet Take 20 mg by mouth daily as needed. PRN    labetaloL (NORMODYNE) 200 MG tablet Take 200 mg by mouth 2 (two) times daily.    losartan (COZAAR) 100 MG tablet Take 100 mg by mouth 2 (two) times daily.    sevelamer carbonate (RENVELA) 800 mg Tab Take 1,600 mg by mouth 3 (three) times daily with meals.    sodium bicarbonate 650 MG tablet Take 650 mg by mouth 2 (two) times daily.       Review of patient's allergies indicates:  No Known Allergies    Past Medical History:   Diagnosis Date    Dialysis complication     Hypertension     Obesity, unspecified      Past Surgical History:   Procedure Laterality Date    ARTERIOVENOUS FISTULOGRAPHY Left 8/14/2023    Procedure: FISTULOGRAM, ARTERIOVENOUS;  Surgeon: Deshawn Rangel DO;  Location: [facility] CATH LAB;  Service: Nephrology;  Laterality: Left;       Social History     Tobacco Use    Smoking  "status: Never    Smokeless tobacco: Never   Substance Use Topics    Alcohol use: Not Currently    Drug use: Never        Review of Systems:    No recent fevers   Denies any other recent health changes or symptoms    OBJECTIVE:     Vital Signs (Most Recent):  Temp: 98.2 °F (36.8 °C) (12/29/23 0903)  Pulse: 64 (12/29/23 0903)  BP: (!) 167/64 (12/29/23 0903)  SpO2: 97 % (12/29/23 0903)    Physical Exam:   Pleasant black woman resting in bed   She is conversant somewhat overweight   No scleral icterus and ears eyes nose and mouth are grossly normal  She is breathing easily    The heart rhythm is regular   Abdomen soft and nontender   Legs without significant edema   Left upper arm brachiocephalic fistula has healthy overlying skin and strong pulsatility without significant thrill.      Laboratory:  I have reviewed all pertinent lab results within the past 24 hours.  CBC: No results for input(s): "WBC", "RBC", "HGB", "HCT", "PLT", "MCV", "MCH", "MCHC" in the last 168 hours.  BMP:   Recent Labs   Lab 12/29/23 0934      K 5.7*   CO2 14*   .0*   CREATININE 17.93*   CALCIUM 7.7*       Diagnostic Results:      ASSESSMENT/PLAN:       Mrs. Ruby is an 83  year old woman with end-stage renal disease with mechanical complications of her left arm brachiocephalic fistula.  She has strong pulsatility and high venous pressures with likely recurrence of her left cephalic arch stenosis.  I would recommend fistulogram with likely intervention including angioplasty and/or stenting.  I reiterated to her today that this particular area  can be complicated for treatment.  Risks and benefits of the procedure including the risks of bleeding, access injury, and she gives consent to proceed.         "

## 2023-12-29 NOTE — Clinical Note
There was a nearly occluded left cephalic arch. There was also partially occlusive thrombus seen on Ultrasound. Image saved in EPIC chart.

## 2023-12-29 NOTE — Clinical Note
The balloon was inflated with indeflator in the  . The balloon max pressure was 20 yue for 45 seconds

## 2023-12-29 NOTE — Clinical Note
The balloon was inflated with indeflator in the  . The balloon max pressure was 16 yue for 28 seconds

## 2023-12-29 NOTE — Clinical Note
The balloon was inflated with indeflator in the  . The balloon max pressure was 6 yue for 15 seconds

## 2023-12-29 NOTE — Clinical Note
An angiogram was performed Lesion documentation: Central veins were imaged and left arm AV fistula was imaged.

## 2024-01-05 ENCOUNTER — TELEPHONE (OUTPATIENT)
Dept: CARDIOLOGY | Facility: HOSPITAL | Age: 84
End: 2024-01-05
Payer: MEDICARE

## 2024-01-05 NOTE — TELEPHONE ENCOUNTER
Ms. Ruby called the office today with concerns of left arm swelling. She was wondering if it may be caused by her dialysis access. She does have a history of central stenosis with PTA (12/29/23 and 8/14/23). She reports that yesterday she had dialysis, fell at home then went to the ER because her whole body was shaking. She was evaluated in ER at [facility], they didn't find anything wrong and sent her home. She reports she will follow up with her primary care doctor on Monday. Her shaking is not as bad today as yesterday.     Contacted HD unit for further details. Tracy reports a sudden drop in BP during treatment yesterday:  to 98, then was shortly back up to 149. She was able to complete treatment. She did NOT have any noticeable arm swelling. She reports normal venous pressures and adequate BFR. With this information I do not see a need for Fistulogram.     Consulted with Dr. Rangel who agrees with plan. She will follow up as needed for dialysis access issues. Contacted Ms. Ruby with plan. I stressed the importance of seeing her primary care doctor next week and to report to the ER for any concerns. She states understanding and will be with her daughter today incase she needs. [place]

## 2024-03-12 ENCOUNTER — TELEPHONE (OUTPATIENT)
Dept: CARDIOLOGY | Facility: HOSPITAL | Age: 84
End: 2024-03-12
Payer: MEDICARE

## 2024-03-12 DIAGNOSIS — T82.590A MECHANICAL COMPLICATION OF ARTERIOVENOUS FISTULA SURGICALLY CREATED, INITIAL ENCOUNTER: Primary | ICD-10-CM

## 2024-03-12 RX ORDER — SODIUM CHLORIDE 0.9 % (FLUSH) 0.9 %
10 SYRINGE (ML) INJECTION
Status: CANCELLED | OUTPATIENT
Start: 2024-03-12

## 2024-03-12 NOTE — TELEPHONE ENCOUNTER
CARLOTTA harrison called reporting access dysfunction on Ms. Ruby. She was NOT able to dialyze today  due to recirculation despite attempts of new cannulation and new lines. Her last HD treatment was Friday 3/8/24 without any issues. Recent clearance was 0.88 on 3/5 and 1.27 on 2/6/24.     Previous intervention on 12/29/23 was PTA of the cephalic arch and Left innominate vein. There was chronic thrombus noted and Dr. Bass suggested possible Ligation with new access creation.     Contacted the patient for scheduling. We advised evaluation and procedure urgently tomorrow. We will need to discuss the potential need for TDC if Left arm AVF is determined unreliable. Patient agreed to schedule procedure and understands she may need to dialyze after procedure. Her daughter will be her transportation. All instructions were given and pt stated understanding. [place]

## 2024-03-13 ENCOUNTER — HOSPITAL ENCOUNTER (OUTPATIENT)
Facility: HOSPITAL | Age: 84
Discharge: HOME | End: 2024-03-13
Attending: STUDENT IN AN ORGANIZED HEALTH CARE EDUCATION/TRAINING PROGRAM | Admitting: STUDENT IN AN ORGANIZED HEALTH CARE EDUCATION/TRAINING PROGRAM
Payer: MEDICARE

## 2024-03-13 VITALS
HEIGHT: 60 IN | HEART RATE: 62 BPM | BODY MASS INDEX: 36.44 KG/M2 | TEMPERATURE: 98 F | DIASTOLIC BLOOD PRESSURE: 77 MMHG | WEIGHT: 185.63 LBS | OXYGEN SATURATION: 94 % | RESPIRATION RATE: 20 BRPM | SYSTOLIC BLOOD PRESSURE: 231 MMHG

## 2024-03-13 DIAGNOSIS — T82.590A MECHANICAL COMPLICATION OF ARTERIOVENOUS FISTULA SURGICALLY CREATED, INITIAL ENCOUNTER: ICD-10-CM

## 2024-03-13 LAB
ANION GAP SERPL CALC-SCNC: 20 MEQ/L
BUN SERPL-MCNC: 109 MG/DL (ref 9.8–20.1)
CALCIUM SERPL-MCNC: 7.3 MG/DL (ref 8.4–10.2)
CHLORIDE SERPL-SCNC: 102 MMOL/L (ref 98–107)
CO2 SERPL-SCNC: 14 MMOL/L (ref 23–31)
CREAT SERPL-MCNC: 13.37 MG/DL (ref 0.55–1.02)
CREAT/UREA NIT SERPL: 8
GFR SERPLBLD CREATININE-BSD FMLA CKD-EPI: 3 MLS/MIN/1.73/M2
GLUCOSE SERPL-MCNC: 81 MG/DL (ref 82–115)
HBV SURFACE AG SERPL QL IA: NONREACTIVE
POTASSIUM SERPL-SCNC: 7 MMOL/L (ref 3.5–5.1)
SODIUM SERPL-SCNC: 136 MMOL/L (ref 136–145)

## 2024-03-13 PROCEDURE — 25000003 PHARM REV CODE 250: Performed by: INTERNAL MEDICINE

## 2024-03-13 PROCEDURE — 36558 INSERT TUNNELED CV CATH: CPT | Mod: RT,,, | Performed by: STUDENT IN AN ORGANIZED HEALTH CARE EDUCATION/TRAINING PROGRAM

## 2024-03-13 PROCEDURE — 99152 MOD SED SAME PHYS/QHP 5/>YRS: CPT | Performed by: STUDENT IN AN ORGANIZED HEALTH CARE EDUCATION/TRAINING PROGRAM

## 2024-03-13 PROCEDURE — 63600175 PHARM REV CODE 636 W HCPCS: Performed by: STUDENT IN AN ORGANIZED HEALTH CARE EDUCATION/TRAINING PROGRAM

## 2024-03-13 PROCEDURE — 77001 FLUOROGUIDE FOR VEIN DEVICE: CPT | Mod: 26,,, | Performed by: STUDENT IN AN ORGANIZED HEALTH CARE EDUCATION/TRAINING PROGRAM

## 2024-03-13 PROCEDURE — 99213 OFFICE O/P EST LOW 20 MIN: CPT | Mod: 25,,, | Performed by: STUDENT IN AN ORGANIZED HEALTH CARE EDUCATION/TRAINING PROGRAM

## 2024-03-13 PROCEDURE — 25000003 PHARM REV CODE 250: Mod: JZ,JG | Performed by: STUDENT IN AN ORGANIZED HEALTH CARE EDUCATION/TRAINING PROGRAM

## 2024-03-13 PROCEDURE — G0257 UNSCHED DIALYSIS ESRD PT HOS: HCPCS

## 2024-03-13 PROCEDURE — 90935 HEMODIALYSIS ONE EVALUATION: CPT

## 2024-03-13 PROCEDURE — C1750 CATH, HEMODIALYSIS,LONG-TERM: HCPCS | Performed by: STUDENT IN AN ORGANIZED HEALTH CARE EDUCATION/TRAINING PROGRAM

## 2024-03-13 PROCEDURE — 87340 HEPATITIS B SURFACE AG IA: CPT | Performed by: INTERNAL MEDICINE

## 2024-03-13 PROCEDURE — 36558 INSERT TUNNELED CV CATH: CPT | Performed by: STUDENT IN AN ORGANIZED HEALTH CARE EDUCATION/TRAINING PROGRAM

## 2024-03-13 PROCEDURE — 77001 FLUOROGUIDE FOR VEIN DEVICE: CPT | Performed by: STUDENT IN AN ORGANIZED HEALTH CARE EDUCATION/TRAINING PROGRAM

## 2024-03-13 PROCEDURE — 80048 BASIC METABOLIC PNL TOTAL CA: CPT | Performed by: STUDENT IN AN ORGANIZED HEALTH CARE EDUCATION/TRAINING PROGRAM

## 2024-03-13 PROCEDURE — 99152 MOD SED SAME PHYS/QHP 5/>YRS: CPT | Mod: ,,, | Performed by: STUDENT IN AN ORGANIZED HEALTH CARE EDUCATION/TRAINING PROGRAM

## 2024-03-13 DEVICE — GLIDEPATH HEMODIALYSIS CATH, ST, DL 14.5 FR. 19CM
Type: IMPLANTABLE DEVICE | Site: CHEST  WALL | Status: FUNCTIONAL
Brand: GLIDEPATH LONG-TERM HEMODIALYSIS CATHETER WITH PRELOADED STYLET

## 2024-03-13 RX ORDER — MIDAZOLAM HYDROCHLORIDE 1 MG/ML
INJECTION INTRAMUSCULAR; INTRAVENOUS
Status: DISCONTINUED | OUTPATIENT
Start: 2024-03-13 | End: 2024-03-13 | Stop reason: HOSPADM

## 2024-03-13 RX ORDER — SODIUM CHLORIDE 0.9 % (FLUSH) 0.9 %
10 SYRINGE (ML) INJECTION
Status: DISCONTINUED | OUTPATIENT
Start: 2024-03-13 | End: 2024-03-13 | Stop reason: HOSPADM

## 2024-03-13 RX ORDER — CLONIDINE HYDROCHLORIDE 0.1 MG/1
0.1 TABLET ORAL ONCE
Status: COMPLETED | OUTPATIENT
Start: 2024-03-13 | End: 2024-03-13

## 2024-03-13 RX ORDER — CALCIUM GLUCONATE 20 MG/ML
INJECTION, SOLUTION INTRAVENOUS
Status: DISCONTINUED | OUTPATIENT
Start: 2024-03-13 | End: 2024-03-13 | Stop reason: HOSPADM

## 2024-03-13 RX ORDER — LIDOCAINE HYDROCHLORIDE 10 MG/ML
INJECTION INFILTRATION; PERINEURAL
Status: DISCONTINUED | OUTPATIENT
Start: 2024-03-13 | End: 2024-03-13 | Stop reason: HOSPADM

## 2024-03-13 RX ORDER — MUPIROCIN 20 MG/G
OINTMENT TOPICAL 2 TIMES DAILY
Status: DISCONTINUED | OUTPATIENT
Start: 2024-03-13 | End: 2024-03-13 | Stop reason: HOSPADM

## 2024-03-13 RX ORDER — CLONIDINE HYDROCHLORIDE 0.1 MG/1
0.1 TABLET ORAL ONCE
Qty: 1 TABLET | Refills: 0 | Status: SHIPPED | OUTPATIENT
Start: 2024-03-13 | End: 2024-05-21

## 2024-03-13 RX ORDER — FENTANYL CITRATE 50 UG/ML
INJECTION, SOLUTION INTRAMUSCULAR; INTRAVENOUS
Status: DISCONTINUED | OUTPATIENT
Start: 2024-03-13 | End: 2024-03-13 | Stop reason: HOSPADM

## 2024-03-13 RX ORDER — SODIUM CHLORIDE 9 MG/ML
INJECTION, SOLUTION INTRAVENOUS ONCE
Status: CANCELLED | OUTPATIENT
Start: 2024-03-13 | End: 2024-03-13

## 2024-03-13 RX ADMIN — CLONIDINE HYDROCHLORIDE 0.1 MG: 0.1 TABLET ORAL at 11:03

## 2024-03-13 RX ADMIN — SODIUM ZIRCONIUM CYCLOSILICATE 10 G: 10 POWDER, FOR SUSPENSION ORAL at 09:03

## 2024-03-13 RX ADMIN — MUPIROCIN: 20 OINTMENT TOPICAL at 10:03

## 2024-03-13 NOTE — PLAN OF CARE
Post dialysis BP elevated; Pt states her BP is always elevated after her dialysis runs. States she did not take any of her normal BP medications this am. Encouraged to take them now, refused and stated she would take at home. Offered turkey sandwich/chips so not taking meds on empty stomach. Again stated would take all medications when home.     Updated daughter to monitor for medications taking as soon as gets home.

## 2024-03-13 NOTE — Clinical Note
Catheter tip was inserted into sheath and pull away sheath was removed. Tip was verified under fluoroscopy. Ports were flushed and aspirated appropriately. Ports were locked with Heparin 1.6 ml each.

## 2024-03-13 NOTE — PLAN OF CARE
INTERVENTIONAL NEPHROLOGY HISTORY & PHYSICAL UPDATE         Patient Name: Jyoti Ruby  KONRAD 1940    Date: 3/13/2024      Pt was found to have severe hyperkalemia (serum potassium was critically elevated to 7). Pt also with elevated BUN and metabolic acidosis. Clinically, pt does not appear to be deteriorating.    Left arm fistula underwent ultrasound. Access is mostly thrombosed, with large cavernous aneurysm filled with thrombus. With a critically elevated serum potassium, our normal plan would be for placement of a tunneled catheter. The pt will also likely need abandonment of fistula vs open thrombectomy given clot burden.    Will plan on placement of tunneled catheter followed by urgent dialysis based on HD unit availability. In the meantime we will medicate with calcium gluconate, insulin, D50 (or D10 equivalent) and Lokelma.    Please reach me with any questions.    Deshawn Rangel DO  Interventional Nephrology  Cell: [phone]

## 2024-03-13 NOTE — DISCHARGE SUMMARY
INTERVENTIONAL NEPHROLOGY DISCHARGE SUMMARY         Patient Name: Jyoti Ruby   1940    Procedure Date: 3/13/2024      In brief, Ms. Ruby underwent urgent RIJ/RCW tunneled dialysis catheter insertion due to having a thrombosed L BC AVF and critically elevated serum potassium of 7.0. Pre-procedure ultrasound revealed thrombosis of the fistula with cavernous aneurysms harboring significant clot burden. This would be unlikely amenable to percutaneous thrombectomy. Additionally, her critically elevated potassium warranted immediate insertion of a tunneled dialysis catheter so that definitive treatment (hemodialysis) could take place.    Insertion of the tunneled catheter was uneventful. The pt was medicated with calcium gluconate 2g IV, D50 (D10 equivalent given) IV, insulin 5 units IV, and Lokelma 10 g PO. She will have hemodialysis when the dialysis unit is available.    The pt should undergo dialysis tomorrow as well.    The pt will be referred to vascular surgery clinic for evaluation. The access has history of chronic thrombus and a severe stenosis in an aberrant cephalic arch. During her last intervention in 2023 completed by Dr. Bass, he was concerned of long-term patency of the fistula, and contemplated abandonment of access if short-term intervention was required.     Pre-Op Diagnosis: T82.868A Thrombosis of vascular prosthetic devices, implants and grafts, initial encounter, N18.6 End Stage Renal Disease (ESRD)  Post-Op Diagnosis: Same.    Discharge Instructions/Recommendations:  - Start use of dialysis catheter for HD. Pt to return to vascular surgery clinic for evaluation soon.  - Pt may be discharged after successful monitoring in post-op area.  - Pt may resume home medications.    Thank you for allowing me the opportunity in taking care of this patient. Please reach me with any questions.    Deshawn Rangel,   Interventional Nephrology  Cell: [phone]

## 2024-03-13 NOTE — H&P
INTERVENTIONAL NEPHROLOGY HISTORY & PHYSICAL       Patient Name: Jyoti Ruby  KON[place] 1940    Date: 3/13/2024  Time: 7:28 AM         HPI: 83 y.o. female with ESRD on HD via left brachiocephalic arteriovenous fistula who presents with difficult cannulation, poor clearances, and inability to initiate dialysis. Pt attempted dialysis treatment yesterday however was unsuccessful, and was then referred to our clinic. Most recent clearance was ~0.8. Pt is being prepared for fistulogram with possible intervention today.    Pt seen and examined at bedside in [facility] this AM. Family is present. Risks and benefits of fistulogram with possible intervention and intravenous conscious sedation was reviewed with the patient. The patient agrees to proceed with the intended procedure. Consents for both intravenous conscious sedation and procedure were signed and placed within the chart.       Review of Systems:  General:  No fatigue  Skin: No rashes  HEENT: No vision changes  CVS: No CP  RS: No SOB  GIT: No abdominal pain  Extremities: No swelling  Neurological:  No focal weakness  Psych: No depression    Past Medical History:   Diagnosis Date    Dialysis complication     Hypertension     Obesity, unspecified       Past Surgical History:   Procedure Laterality Date    ARTERIOVENOUS FISTULOGRAPHY Left 2023    Procedure: FISTULOGRAM, ARTERIOVENOUS;  Surgeon: Deshawn Rangel DO;  Location: [facility] CATH LAB;  Service: Nephrology;  Laterality: Left;    FISTULOGRAM Left 2023    Procedure: Fistulogram;  Surgeon: Cate Bass MD;  Location: [facility] CATH LAB;  Service: Peripheral Vascular;  Laterality: Left;      Review of patient's allergies indicates:  No Known Allergies   Social History     Tobacco Use    Smoking status: Never    Smokeless tobacco: Never   Substance Use Topics    Alcohol use: Not Currently    Drug use: Never      History reviewed. No pertinent family history.      Current Facility-Administered  Medications:     sodium chloride 0.9% flush 10 mL, 10 mL, Intravenous, PRN, Deshawn Rangel DO    Vital Signs:  Temp:  [97.8 °F (36.6 °C)] 97.8 °F (36.6 °C)  Pulse:  [64] 64  SpO2:  [100 %] 100 %  BP: (178)/(80) 178/80     Physical Exam:  General: NAD  HEENT: NC/AT, EOMI  CVS: RRR.  RS: breathing easily.  Abdominal: Soft, NT/ND.  Extremities: No edema b/l LE  Skin: No rash, no lesions.  Neurological: No focal deficits.  Psych: Normal affect  Dialysis Access: left brachiocephalic arteriovenous fistula with severe pulsatility. Large bruise noted at AC fossa.    Results:    Lab Results   Component Value Date     (L) 01/30/2024    K 4.0 01/30/2024    CL 94 (L) 01/30/2024    CO2 26 01/30/2024    BUN 27 (H) 01/30/2024    CREATININE 4.95 (H) 01/30/2024     Lab Results   Component Value Date    WBC 6.2 05/05/2020    HGB 10.9 (L) 05/05/2020     05/05/2020    MCV 93.1 05/05/2020       Assessment and Plan:      ESRD on HD via left brachiocephalic arteriovenous fistula.  Mechanical complication of AVF.  Pt with ESRD on HD via left brachiocephalic arteriovenous fistula who presents today with difficult cannulation. The pt is being prepared for fistulogram with possible intervention today.  - Consents obtained and placed within chart.  - Will proceed in cath lab setting today.  - During her last procedure, there was concern for long-term functioning of her fistula and that if short-term intervention were required, she would probably have better successful with a catheter insertion. We have consented for tunneled catheter insertion as well in case fistula does not appear to be salvageable.     Please feel free to reach me with any questions.    Deshawn Rangel DO  Interventional Nephrology  Cell: [phone]

## 2024-03-13 NOTE — PROCEDURES
INTERVENTIONAL NEPHROLOGY PROCEDURE NOTE:   TUNNELED DIALYSIS CATHETER (TDC) INSERTION         Performing Physician:   Dr. Rangel    Access History: Pt is with ESRD requiring HD.    Pre-Op Diagnosis: N18.6 End Stage Renal Disease (ESRD).  Post-Op Diagnosis: Same    Procedure: Tunneled dialysis catheter insertion.    Indication: ESRD requiring HD.    Anatomical Site: right internal jugular (RIJ)/right chest wall (RCW)    Informed Consent:  The patient was evaluated in the pre-operative area with assessment including the American Society of Anesthesia risk classification. The procedure is discussed in detail including risks, benefits alternatives and options and the patient agrees to proceed. Informed consent was obtained from the patient.     Maximum sterile barrier technique: The patient was prepped and draped using chlorhexidine prep and maximum sterile barrier technique.    Sedation Note:  Risks and benefits of sedation were reviewed with the patient or surrogate, including bleeding, infection, nausea, vomiting, dizziness, instability, damage to a nerve, damage to a blood vessel, cellulitis, reaction to medications, amnesia, loss of consciousness, respiratory arrest, cardiac arrest.     The patient received the following medications: Versed 1 mg IV and Fentanyl 75 mcg IV; patient did remain alert, responsive, and conversational throughout the procedure. I was personally responsible for supervising the administration of moderate sedation services during the procedure performed and I affirm all the guidelines and requirements described in the CPT 2024 section on moderate sedation were followed, including the use of an independent trained observer who had no other duties during the procedure. The total face-to-face time was 22 minutes.    Procedure Steps:   The patient was prepped and draped in sterile fashion. Procedure ultrasound revealed patent right internal jugular vein. Local anesthesia was administered by  "injecting 1% lidocaine at the intended site of cannulation. With use of live ultrasonographic visualization, the vessel was cannulated with a  21 g mini-stick needle (image saved to chart). The 0.018" mini-stick guide wire was introduced being careful not to bend the tip. Then by using Seldinger technique, the needle was exchanged for the mini-stick sheath. The inner cannula and mini-stick wire were removed and a J wire was inserted through the sheath. The wire was guided with fluoroscopic guidance, with the tip parked in the inferior vena cava. At this time a cut down on the vessel was made extending the venotomy laterally to the expected tunnel.    The catheter exit site and tunnel was approximated and infiltrated with lidocaine. A stab incision was made at the exit site. The 19 cm (cuff-to-tip) tunneled dialysis catheter assembly with tunneler was then tunneled up through the exit site to the lateral aspect of the venotomy and the catheter pulled through the tunnel. The cuff was placed approximately 1.5 centimeters inside the tunnel. The 12 Fr dilator was passed freely over the wire; it was removed and replaced sequentially with 14 Fr and 16 Fr dilators. The permacath was then inserted via pull-away sheath method. The placement of the catheter tip (at the junction of the SVC and right atrium) and the top of the permacath was confirmed with fluoroscopy.     Catheter appeared to function well with various tests utilizing a 10 cc syringe. The catheter limbs were then flushed with saline, followed by instillation of appropriate amounts of heparin as marked by the catheter hub. Venotomy site and tunnel exit site was closed with monoderm suture, being careful to not [place] the catheter. Catheter hub was fixed to the chest wall using silk suture.      ASSESSMENT/PLAN:  - Successful placement of right internal jugular (RIJ)/right chest wall (RCW) 19 cm (cuff-to-tip) TDC (BD Glidepath).    EBL: <10 cc    Complications: " None    Post-op Instructions: The patient was given both verbal and written post-op instructions. If excessive bleeding at the site, they have been instructed to call their physician or proceed to a local emergency room.    Orders to the dialysis unit: OK to use tunneled dialysis catheter for HD.    Thank you for allowing me the opportunity in taking care of this patient. Please reach me with any questions.    Deshawn Rangel DO  Interventional Nephrology  Cell: [phone]

## 2024-03-13 NOTE — NURSING
03/13/24 1315   Post-Hemodialysis Assessment   Blood Volume Processed (Liters) 49.3 L   Dialyzer Clearance Clear   Duration of Treatment 140 minutes   Total UF (mL) 1448 mL   Patient Response to Treatment pt requested to come off early.   Post-Hemodialysis Comments pt requested to only run three hours. notified Dr. Styles, he stated as long as she would go to her scheduled appointment tomorrow, she could run for three hours instead of 3.5 hrs. pt's pressure was elevated, notified Dr. Styles, gave and order for clonidine. pt's pressure responded. after 2 hours and 20 minutes pt requested to come off early. I reeducated the pt on the importance of completeing tx. pt is still adament on getting taken off early. i called Dr. styles again and notified him of the pt's wishes. He stated if she was willing to sign stating she was requesting to come off early, then i could terminate tx. pt signed my flow sheet, and she was taken off after running for 2 hours and 20 minutes. net UF 1,448 mL, NAD noted and VSS.

## 2024-03-28 RX ORDER — CEFDINIR 300 MG/1
300 CAPSULE ORAL 2 TIMES DAILY
COMMUNITY
Start: 2024-01-31 | End: 2024-04-09

## 2024-04-08 DIAGNOSIS — Z01.818 OTHER SPECIFIED PRE-OPERATIVE EXAMINATION: ICD-10-CM

## 2024-04-08 DIAGNOSIS — N18.6 ESRD (END STAGE RENAL DISEASE): Primary | ICD-10-CM

## 2024-04-08 NOTE — H&P (VIEW-ONLY)
[facility] Vascular - Clinic Note  Cate Bass MD      Patient Name: Jyoti Ruby                   : 1940     MRN: [id number]   Visit Date: 2024          History Present Illness     Reason for Visit: Dialysis Access Evaluation    Ms. Ruby presents to the clinic for evaluation of her left brachiocephalic fistula due to thrombosis and inability to perform thrombectomy. Access has been clotted since 3/13/24. Tunneled catheter was placed and is functioning well.       REVIEW OF SYSTEMS:  ROS  12 point review of systems conducted, negative except as stated in the history of present illness. See HPI for details.        Physical Exam      Vitals:    24 1426   BP: (!) 158/83   BP Location: Right arm   Pulse: 63   Weight: 82.6 kg (182 lb)        General: well-nourished, no acute distress, and healthy appearing, ambulating normally, alert, pleasant, conversant, and oriented  Neurologic: cranial nerves are grossly intact, no neurologic deficits, no motor deficits, and no sensory deficits  HEENT: grossly normal and no scleral icterus  Neck/Chest: normal  and soft without lymphadenopathy  Respiratory: breathing easily and without respiratory distress  Abdomen: normal and soft  Cardiology: regular rate and rhythm    Upper Extremity Arterial Exam:   Right - radial is palpable  Left - radial is palpable    Dialysis Access:  right chest wall tunneled catheter  left brachiocephalic fistula  Assessment: thrombosed, minimal flow at the arterial anastomosis    Musculoskeletal:   Upper Extremity: normal bilateral hand function, left hand is warm  Lower Extremity: trace edema present to bilateral lower extremities              Assessment and Plan     Ms. Ruby is a 84 y.o. with end stage renal failure who has a thrombosed left brachiocephalic fistula. Due to the continued issues with this access, decision was made to abandon this access. She is in need of permanent access creation. She is  currently using a right chest wall  catheter without difficulty. There is no history of a pacemaker, defibrillator, or mediport. Vein mapping obtained today demonstrates anatomy suitable for a LEFT ARM ACCESS CREATION- LIKELY ARM GRAFT PLACEMENT. We discussed the risks and benefits of surgery at length including the risks of bleeding, infection, failure of access to mature, neurovascular injury, and/or steal syndrome. She wishes to proceed.        1. ESRD (end stage renal disease)  - Basic Metabolic Panel; Future  - CBC Auto Differential; Future  - EKG 12-lead; Future  - X-Ray Chest PA And Lateral Pre-OP; Future           Imaging Obtained/Reviewed   Study: left upper extremity vein mapping  Date:   4/8/24           Medical History     Past Medical History:   Diagnosis Date    Arthritis     Back pain     Dialysis complication     ESRD (end stage renal disease)     on HD    HLD (hyperlipidemia)     Hypertension     Multiple myeloma 2013    Obesity, unspecified      Past Surgical History:   Procedure Laterality Date    ARTERIOVENOUS FISTULOGRAPHY Left 08/14/2023    Procedure: FISTULOGRAM, ARTERIOVENOUS;  Surgeon: Deshawn Rangel DO;  Location: [facility] CATH LAB;  Service: Nephrology;  Laterality: Left;    ARTERIOVENOUS FISTULOGRAPHY Left 03/13/2024    Procedure: FISTULOGRAM-AV;  Surgeon: Deshawn Rangel DO;  Location: [facility] CATH LAB;  Service: Nephrology;  Laterality: Left;    AV FISTULA PLACEMENT Left 01/31/2020    L BC AVF - Dr. Bass    BACK SURGERY      herniated disc    FISTULOGRAM Left 12/29/2023    Procedure: Fistulogram;  Surgeon: Cate Bass MD;  Location: [facility] CATH LAB;  Service: Peripheral Vascular;  Laterality: Left;    HYSTERECTOMY      INSERTION OF TUNNELED CENTRAL VENOUS HEMODIALYSIS CATHETER N/A 03/13/2024    Procedure: Insertion, Catheter, Central Venous, Hemodialysis;  Surgeon: Deshawn Rangel DO;  Location: [facility] CATH LAB;  Service: Nephrology;  Laterality: N/A;     History reviewed. No pertinent  family history.  Social History     Socioeconomic History    Marital status: Unknown   Tobacco Use    Smoking status: Never    Smokeless tobacco: Never   Substance and Sexual Activity    Alcohol use: Not Currently    Drug use: Never    Sexual activity: Not Currently     Current Outpatient Medications   Medication Instructions    amLODIPine (NORVASC) 5 mg, Oral, Daily    calcitRIOL (ROCALTROL) 0.25 mcg, Oral, Daily    cloNIDine (CATAPRES) 0.1 mg, Oral, Once    folic acid-vit B6-vit B12 2.5-25-2 mg (FOLBIC) 2.5-25-2 mg Tab 1 tablet, Oral, Daily    furosemide (LASIX) 20 mg, Oral, Daily PRN, PRN    labetaloL (NORMODYNE) 200 mg, Oral, 2 times daily    losartan (COZAAR) 100 mg, Oral, 2 times daily    sevelamer carbonate (RENVELA) 1,600 mg, Oral, 3 times daily with meals    sodium bicarbonate 650 mg, Oral, 2 times daily     Review of patient's allergies indicates:   Allergen Reactions    Glimepiride      HEADACHE       Patient Care Team:  Angelica Sal Jr. as PCP - General (Internal Medicine)  [facility] as Dialysis Nurse (Dialysis Center)  Torres Styles MD as Consulting Physician (Nephrology)      No follow-ups on file. In addition to their scheduled follow up, the patient has also been instructed to follow up on as needed basis.     No future appointments.

## 2024-04-08 NOTE — PROGRESS NOTES
[facility] Vascular - Clinic Note  Cate Bass MD      Patient Name: Jyoti Ruby                   : 1940     MRN: [id number]   Visit Date: 2024          History Present Illness     Reason for Visit: Dialysis Access Evaluation    Ms. Ruby presents to the clinic for evaluation of her left brachiocephalic fistula due to thrombosis and inability to perform thrombectomy. Access has been clotted since 3/13/24. Tunneled catheter was placed and is functioning well.       REVIEW OF SYSTEMS:  ROS  12 point review of systems conducted, negative except as stated in the history of present illness. See HPI for details.        Physical Exam      Vitals:    24 1426   BP: (!) 158/83   BP Location: Right arm   Pulse: 63   Weight: 82.6 kg (182 lb)        General: well-nourished, no acute distress, and healthy appearing, ambulating normally, alert, pleasant, conversant, and oriented  Neurologic: cranial nerves are grossly intact, no neurologic deficits, no motor deficits, and no sensory deficits  HEENT: grossly normal and no scleral icterus  Neck/Chest: normal  and soft without lymphadenopathy  Respiratory: breathing easily and without respiratory distress  Abdomen: normal and soft  Cardiology: regular rate and rhythm    Upper Extremity Arterial Exam:   Right - radial is palpable  Left - radial is palpable    Dialysis Access:  right chest wall tunneled catheter  left brachiocephalic fistula  Assessment: thrombosed, minimal flow at the arterial anastomosis    Musculoskeletal:   Upper Extremity: normal bilateral hand function, left hand is warm  Lower Extremity: trace edema present to bilateral lower extremities              Assessment and Plan     Ms. Ruby is a 84 y.o. with end stage renal failure who has a thrombosed left brachiocephalic fistula. Due to the continued issues with this access, decision was made to abandon this access. She is in need of permanent access creation. She is  currently using a right chest wall  catheter without difficulty. There is no history of a pacemaker, defibrillator, or mediport. Vein mapping obtained today demonstrates anatomy suitable for a LEFT ARM ACCESS CREATION- LIKELY ARM GRAFT PLACEMENT. We discussed the risks and benefits of surgery at length including the risks of bleeding, infection, failure of access to mature, neurovascular injury, and/or steal syndrome. She wishes to proceed.        1. ESRD (end stage renal disease)  - Basic Metabolic Panel; Future  - CBC Auto Differential; Future  - EKG 12-lead; Future  - X-Ray Chest PA And Lateral Pre-OP; Future           Imaging Obtained/Reviewed   Study: left upper extremity vein mapping  Date:   4/8/24           Medical History     Past Medical History:   Diagnosis Date    Arthritis     Back pain     Dialysis complication     ESRD (end stage renal disease)     on HD    HLD (hyperlipidemia)     Hypertension     Multiple myeloma 2013    Obesity, unspecified      Past Surgical History:   Procedure Laterality Date    ARTERIOVENOUS FISTULOGRAPHY Left 08/14/2023    Procedure: FISTULOGRAM, ARTERIOVENOUS;  Surgeon: Deshawn Rangel DO;  Location: [facility] CATH LAB;  Service: Nephrology;  Laterality: Left;    ARTERIOVENOUS FISTULOGRAPHY Left 03/13/2024    Procedure: FISTULOGRAM-AV;  Surgeon: Deshawn Rangel DO;  Location: [facility] CATH LAB;  Service: Nephrology;  Laterality: Left;    AV FISTULA PLACEMENT Left 01/31/2020    L BC AVF - Dr. Bass    BACK SURGERY      herniated disc    FISTULOGRAM Left 12/29/2023    Procedure: Fistulogram;  Surgeon: Cate Bass MD;  Location: [facility] CATH LAB;  Service: Peripheral Vascular;  Laterality: Left;    HYSTERECTOMY      INSERTION OF TUNNELED CENTRAL VENOUS HEMODIALYSIS CATHETER N/A 03/13/2024    Procedure: Insertion, Catheter, Central Venous, Hemodialysis;  Surgeon: Deshawn Rangel DO;  Location: [facility] CATH LAB;  Service: Nephrology;  Laterality: N/A;     History reviewed. No pertinent  family history.  Social History     Socioeconomic History    Marital status: Unknown   Tobacco Use    Smoking status: Never    Smokeless tobacco: Never   Substance and Sexual Activity    Alcohol use: Not Currently    Drug use: Never    Sexual activity: Not Currently     Current Outpatient Medications   Medication Instructions    amLODIPine (NORVASC) 5 mg, Oral, Daily    calcitRIOL (ROCALTROL) 0.25 mcg, Oral, Daily    cloNIDine (CATAPRES) 0.1 mg, Oral, Once    folic acid-vit B6-vit B12 2.5-25-2 mg (FOLBIC) 2.5-25-2 mg Tab 1 tablet, Oral, Daily    furosemide (LASIX) 20 mg, Oral, Daily PRN, PRN    labetaloL (NORMODYNE) 200 mg, Oral, 2 times daily    losartan (COZAAR) 100 mg, Oral, 2 times daily    sevelamer carbonate (RENVELA) 1,600 mg, Oral, 3 times daily with meals    sodium bicarbonate 650 mg, Oral, 2 times daily     Review of patient's allergies indicates:   Allergen Reactions    Glimepiride      HEADACHE       Patient Care Team:  Angelica Sal Jr. as PCP - General (Internal Medicine)  [facility] as Dialysis Nurse (Dialysis Center)  Torres Styles MD as Consulting Physician (Nephrology)      No follow-ups on file. In addition to their scheduled follow up, the patient has also been instructed to follow up on as needed basis.     No future appointments.

## 2024-04-08 NOTE — PROGRESS NOTES
Per procedure note 3/13/24 - left brachiocephalic fistula being abandoned. Return to clinic to discuss new access creation. Vein mapping ordered placed per protocol.

## 2024-04-09 ENCOUNTER — OFFICE VISIT (OUTPATIENT)
Dept: VASCULAR SURGERY | Facility: CLINIC | Age: 84
End: 2024-04-09
Payer: MEDICARE

## 2024-04-09 VITALS
BODY MASS INDEX: 35.54 KG/M2 | DIASTOLIC BLOOD PRESSURE: 83 MMHG | WEIGHT: 182 LBS | HEART RATE: 63 BPM | SYSTOLIC BLOOD PRESSURE: 158 MMHG

## 2024-04-09 DIAGNOSIS — N18.6 ESRD (END STAGE RENAL DISEASE): Primary | ICD-10-CM

## 2024-04-09 PROCEDURE — 99214 OFFICE O/P EST MOD 30 MIN: CPT | Mod: ,,, | Performed by: SPECIALIST

## 2024-04-09 RX ORDER — HYDROCODONE BITARTRATE AND ACETAMINOPHEN 7.5; 325 MG/1; MG/1
1 TABLET ORAL EVERY 6 HOURS PRN
Qty: 28 TABLET | Refills: 0 | Status: SHIPPED | OUTPATIENT
Start: 2024-04-09 | End: 2024-05-21

## 2024-04-10 DIAGNOSIS — N18.6 END STAGE RENAL DISEASE: ICD-10-CM

## 2024-04-10 DIAGNOSIS — N18.6 ESRD (END STAGE RENAL DISEASE): Primary | ICD-10-CM

## 2024-04-10 RX ORDER — SODIUM CHLORIDE 9 MG/ML
INJECTION, SOLUTION INTRAVENOUS CONTINUOUS
Status: CANCELLED | OUTPATIENT
Start: 2024-04-22

## 2024-04-11 ENCOUNTER — HOSPITAL ENCOUNTER (OUTPATIENT)
Dept: RADIOLOGY | Facility: HOSPITAL | Age: 84
Discharge: HOME | End: 2024-04-11
Attending: SPECIALIST
Payer: MEDICARE

## 2024-04-11 DIAGNOSIS — N18.6 ESRD (END STAGE RENAL DISEASE): ICD-10-CM

## 2024-04-11 PROCEDURE — 71046 X-RAY EXAM CHEST 2 VIEWS: CPT | Mod: TC

## 2024-04-16 NOTE — DISCHARGE INSTRUCTIONS
"Patient Education       Arteriovenous Graft for Dialysis Discharge Instructions          What care is needed at home?   Do not wear anything tight on the graft arm like watches, tight sleeves, or bracelets.  Take extra care not to hit your graft arm on doorways, furniture, or other heavy, solid objects.  Do not carry anything heavy on the graft arm like purses, bags, or children.  Do not let anyone except a dialysis nurse draw blood from your fistula arm.  Do not let anyone start an I.V. on your graft arm.  Do not let anyone take a blood pressure on your graft arm.  Wear your sling until the block wears off tomorrow.  Keep surgical extremity elevated.  Do not soak your incision under water. No tub baths, swimming, or hot tubs.   May take a shower. Wash incision gently, pat dry, then leave open to air. Do not pick or peel off dermabond (skin glue). This will help prevent infection.   Do not use ointments, creams, oils, and lotions on your incision.     What follow-up care is needed?   Be sure to keep your follow-up appointment.  You will be closely watched by healthcare staff during your treatments. Your fistula will be checked at each visit before dialysis is started.  A dialysis nurse will check for:  Signs of infection including warmth or redness of the skin, swelling of the arm with the graft, drainage or open sores on the graft arm  Blood flow ? The dialysis nurse will feel your arm for the vibration or "thrill". This happens as blood flows through the fistula. The nurse will use a stethoscope to listen to the sound of the blood going through the graft. This is called the bruit.    What problems could happen?   Infection  Blood clot that limits blood flow. If blood clots happen often, you may need a new graft or the clots may have to be removed.  Drainage lasts longer than 48 hours  Coldness and pain in the hand or arm (needs care right away)  Numbness in the fingers  Swelling or bulge at the access site  Graft " "does not work right  Graft may fail to develop or mature  Bleeding    When do I need to call the doctor?   Signs of infection. These include a fever of 100.4°F (38°C) or higher, chills.  Signs of wound infection. These include swelling, redness, warmth around the wound; too much pain when touched; yellowish, greenish, or bloody discharge; foul smell coming from the cut site; cut site opens up.  Pain at the site  Coldness or pain in the hand or arm  Bleeding from your graft that does not stop after 20 minutes of gentle pressure  You do not feel the vibration or "thrill" as blood flows through the graft    "

## 2024-04-20 ENCOUNTER — ANESTHESIA EVENT (OUTPATIENT)
Dept: SURGERY | Facility: HOSPITAL | Age: 84
End: 2024-04-20
Payer: MEDICARE

## 2024-04-20 RX ORDER — DROPERIDOL 2.5 MG/ML
0.25 INJECTION, SOLUTION INTRAMUSCULAR; INTRAVENOUS ONCE AS NEEDED
Status: CANCELLED | OUTPATIENT
Start: 2024-04-20 | End: 2035-09-17

## 2024-04-20 NOTE — ANESTHESIA PREPROCEDURE EVALUATION
04/20/2024  Jyoti Ruby is a 84 y.o., female with HTN, DM, multiple myeloma in remission, PVD with LE stents, ESRD on HD for AV graft left UE    4/24 SR increased QT  2/23 Nuc Stres no ischemia, 65% EF  1/23 ECHO 65% ef, mod-sev LVH, mod AI.      Pre-op Assessment    I have reviewed the Patient Summary Reports.     I have reviewed the Nursing Notes. I have reviewed the NPO Status.   I have reviewed the Medications.     Review of Systems  Anesthesia Hx:  No problems with previous Anesthesia             Denies Family Hx of Anesthesia complications.    Denies Personal Hx of Anesthesia complications.                    Hematology/Oncology:  Hematology Normal   Oncology Normal                                   EENT/Dental:  EENT/Dental Normal    Ears General/Symptom(s)    Ear Disease:  Tympanic Membrane Problem        Cardiovascular:  Cardiovascular Normal                                            Pulmonary:  Pulmonary Normal                       Renal/:  Renal/ Normal                 Hepatic/GI:  Hepatic/GI Normal                 Musculoskeletal:  Musculoskeletal Normal                Neurological:  Neurology Normal                                      Endocrine:  Endocrine Normal            Dermatological:  Skin Normal    Psych:  Psychiatric Normal                    Physical Exam  General: Well nourished    Airway:  Mallampati: IV   Mouth Opening: Normal  TM Distance: Normal  Tongue: Large  Neck ROM: Normal ROM    Dental:  Intact    Chest/Lungs:  Clear to auscultation, Normal Respiratory Rate    Heart:  Rate: Normal  Rhythm: Regular Rhythm        Anesthesia Plan  Type of Anesthesia, risks & benefits discussed:    Anesthesia Type: Gen Supraglottic Airway  Intra-op Monitoring Plan: Standard ASA Monitors  Post Op Pain Control Plan: multimodal analgesia, IV/PO Opioids PRN and peripheral nerve  block  Induction:  IV  Informed Consent: Informed consent signed with the Patient and all parties understand the risks and agree with anesthesia plan.  All questions answered.   ASA Score: 4  Day of Surgery Review of History & Physical: H&P Update referred to the surgeon/provider.I have interviewed and examined the patient. I have reviewed the patient's H&P dated: There are no significant changes. H&P completed by Anesthesiologist.    Ready For Surgery From Anesthesia Perspective.     .

## 2024-04-22 ENCOUNTER — ANESTHESIA (OUTPATIENT)
Dept: SURGERY | Facility: HOSPITAL | Age: 84
End: 2024-04-22
Payer: MEDICARE

## 2024-04-22 ENCOUNTER — HOSPITAL ENCOUNTER (OUTPATIENT)
Facility: HOSPITAL | Age: 84
Discharge: HOME | End: 2024-04-22
Attending: SPECIALIST | Admitting: SPECIALIST
Payer: MEDICARE

## 2024-04-22 DIAGNOSIS — N18.6 END STAGE RENAL DISEASE: ICD-10-CM

## 2024-04-22 DIAGNOSIS — N18.6 ESRD (END STAGE RENAL DISEASE): ICD-10-CM

## 2024-04-22 LAB
ANION GAP SERPL CALC-SCNC: 17 MMOL/L (ref 8–16)
BUN SERPL-MCNC: 47 MG/DL (ref 6–30)
CHLORIDE SERPL-SCNC: 98 MMOL/L (ref 95–110)
CREAT SERPL-MCNC: 8.9 MG/DL (ref 0.5–1.4)
GLUCOSE SERPL-MCNC: 81 MG/DL (ref 70–110)
HCT VFR BLD CALC: 32 %PCV (ref 36–54)
HGB BLD-MCNC: 11 G/DL
POC IONIZED CALCIUM: 1.04 MMOL/L (ref 1.06–1.42)
POC TCO2 (MEASURED): 26 MMOL/L (ref 23–29)
POTASSIUM BLD-SCNC: 4.5 MMOL/L (ref 3.5–5.1)
SAMPLE: ABNORMAL
SODIUM BLD-SCNC: 136 MMOL/L (ref 136–145)

## 2024-04-22 PROCEDURE — D9220A PRA ANESTHESIA: Mod: ANES,,, | Performed by: ANESTHESIOLOGY

## 2024-04-22 PROCEDURE — 64415 NJX AA&/STRD BRCH PLXS IMG: CPT | Mod: 59,LT,, | Performed by: ANESTHESIOLOGY

## 2024-04-22 PROCEDURE — C1768 GRAFT, VASCULAR: HCPCS | Performed by: SPECIALIST

## 2024-04-22 PROCEDURE — 25000003 PHARM REV CODE 250: Performed by: ANESTHESIOLOGY

## 2024-04-22 PROCEDURE — 25000003 PHARM REV CODE 250: Performed by: SPECIALIST

## 2024-04-22 PROCEDURE — D9220A PRA ANESTHESIA: Mod: CRNA,,, | Performed by: NURSE ANESTHETIST, CERTIFIED REGISTERED

## 2024-04-22 PROCEDURE — 36000706: Performed by: SPECIALIST

## 2024-04-22 PROCEDURE — 25000003 PHARM REV CODE 250: Performed by: NURSE ANESTHETIST, CERTIFIED REGISTERED

## 2024-04-22 PROCEDURE — 37000009 HC ANESTHESIA EA ADD 15 MINS: Performed by: SPECIALIST

## 2024-04-22 PROCEDURE — 36832 AV FISTULA REVISION OPEN: CPT | Mod: LT,,, | Performed by: SPECIALIST

## 2024-04-22 PROCEDURE — 37000008 HC ANESTHESIA 1ST 15 MINUTES: Performed by: SPECIALIST

## 2024-04-22 PROCEDURE — 63600175 PHARM REV CODE 636 W HCPCS: Performed by: ANESTHESIOLOGY

## 2024-04-22 PROCEDURE — 63600175 PHARM REV CODE 636 W HCPCS: Performed by: NURSE ANESTHETIST, CERTIFIED REGISTERED

## 2024-04-22 PROCEDURE — 63600175 PHARM REV CODE 636 W HCPCS: Performed by: SPECIALIST

## 2024-04-22 PROCEDURE — 71000015 HC POSTOP RECOV 1ST HR: Performed by: SPECIALIST

## 2024-04-22 PROCEDURE — 64415 NJX AA&/STRD BRCH PLXS IMG: CPT | Performed by: ANESTHESIOLOGY

## 2024-04-22 PROCEDURE — 36000707: Performed by: SPECIALIST

## 2024-04-22 PROCEDURE — 71000016 HC POSTOP RECOV ADDL HR: Performed by: SPECIALIST

## 2024-04-22 PROCEDURE — 71000033 HC RECOVERY, INTIAL HOUR: Performed by: SPECIALIST

## 2024-04-22 DEVICE — GRAFT PROPATEN 4-7MM X 45CM: Type: IMPLANTABLE DEVICE | Site: ARM | Status: FUNCTIONAL

## 2024-04-22 RX ORDER — FENTANYL CITRATE 50 UG/ML
25 INJECTION, SOLUTION INTRAMUSCULAR; INTRAVENOUS ONCE
Status: COMPLETED | OUTPATIENT
Start: 2024-04-22 | End: 2024-04-22

## 2024-04-22 RX ORDER — EPINEPHRINE 1 MG/ML
1 INJECTION INTRAMUSCULAR; INTRAVENOUS; SUBCUTANEOUS ONCE
Status: DISCONTINUED | OUTPATIENT
Start: 2024-04-22 | End: 2024-04-22

## 2024-04-22 RX ORDER — METOCLOPRAMIDE HYDROCHLORIDE 5 MG/ML
10 INJECTION INTRAMUSCULAR; INTRAVENOUS ONCE AS NEEDED
Status: DISCONTINUED | OUTPATIENT
Start: 2024-04-22 | End: 2024-04-22

## 2024-04-22 RX ORDER — CEFAZOLIN SODIUM 1 G/3ML
INJECTION, POWDER, FOR SOLUTION INTRAMUSCULAR; INTRAVENOUS
Status: DISCONTINUED
Start: 2024-04-22 | End: 2024-04-22 | Stop reason: HOSPADM

## 2024-04-22 RX ORDER — ROPIVACAINE HYDROCHLORIDE 5 MG/ML
20 INJECTION, SOLUTION EPIDURAL; INFILTRATION; PERINEURAL ONCE
Status: DISCONTINUED | OUTPATIENT
Start: 2024-04-22 | End: 2024-04-22

## 2024-04-22 RX ORDER — DIPHENHYDRAMINE HYDROCHLORIDE 50 MG/ML
6.25 INJECTION INTRAMUSCULAR; INTRAVENOUS ONCE AS NEEDED
Status: DISCONTINUED | OUTPATIENT
Start: 2024-04-22 | End: 2024-04-22

## 2024-04-22 RX ORDER — CEFAZOLIN SODIUM 1 G/3ML
2 INJECTION, POWDER, FOR SOLUTION INTRAMUSCULAR; INTRAVENOUS
Status: DISCONTINUED | OUTPATIENT
Start: 2024-04-22 | End: 2024-04-22

## 2024-04-22 RX ORDER — ASPIRIN 325 MG
325 TABLET, DELAYED RELEASE (ENTERIC COATED) ORAL ONCE
Status: DISCONTINUED | OUTPATIENT
Start: 2024-04-22 | End: 2024-04-22 | Stop reason: HOSPADM

## 2024-04-22 RX ORDER — LIDOCAINE HYDROCHLORIDE 10 MG/ML
INJECTION, SOLUTION EPIDURAL; INFILTRATION; INTRACAUDAL; PERINEURAL
Status: DISCONTINUED | OUTPATIENT
Start: 2024-04-22 | End: 2024-04-22

## 2024-04-22 RX ORDER — ROPIVACAINE HYDROCHLORIDE 5 MG/ML
INJECTION, SOLUTION EPIDURAL; INFILTRATION; PERINEURAL
Status: COMPLETED | OUTPATIENT
Start: 2024-04-22 | End: 2024-04-22

## 2024-04-22 RX ORDER — EPINEPHRINE 1 MG/ML
INJECTION, SOLUTION, CONCENTRATE INTRAVENOUS
Status: DISCONTINUED
Start: 2024-04-22 | End: 2024-04-22 | Stop reason: HOSPADM

## 2024-04-22 RX ORDER — PROPOFOL 10 MG/ML
VIAL (ML) INTRAVENOUS
Status: DISCONTINUED | OUTPATIENT
Start: 2024-04-22 | End: 2024-04-22

## 2024-04-22 RX ORDER — PROTAMINE SULFATE 10 MG/ML
INJECTION, SOLUTION INTRAVENOUS
Status: DISCONTINUED | OUTPATIENT
Start: 2024-04-22 | End: 2024-04-22

## 2024-04-22 RX ORDER — ACETAMINOPHEN 500 MG
1000 TABLET ORAL
Status: COMPLETED | OUTPATIENT
Start: 2024-04-22 | End: 2024-04-22

## 2024-04-22 RX ORDER — PROTAMINE SULFATE 10 MG/ML
INJECTION, SOLUTION INTRAVENOUS
Status: COMPLETED
Start: 2024-04-22 | End: 2024-04-22

## 2024-04-22 RX ORDER — LIDOCAINE HYDROCHLORIDE 10 MG/ML
INJECTION INFILTRATION; PERINEURAL
Status: DISCONTINUED
Start: 2024-04-22 | End: 2024-04-22 | Stop reason: WASHOUT

## 2024-04-22 RX ORDER — CEFAZOLIN SODIUM 1 G/3ML
INJECTION, POWDER, FOR SOLUTION INTRAMUSCULAR; INTRAVENOUS
Status: DISCONTINUED | OUTPATIENT
Start: 2024-04-22 | End: 2024-04-22 | Stop reason: HOSPADM

## 2024-04-22 RX ORDER — HEPARIN SODIUM 5000 [USP'U]/ML
INJECTION, SOLUTION INTRAVENOUS; SUBCUTANEOUS
Status: DISCONTINUED | OUTPATIENT
Start: 2024-04-22 | End: 2024-04-22 | Stop reason: HOSPADM

## 2024-04-22 RX ORDER — PAPAVERINE HYDROCHLORIDE 30 MG/ML
INJECTION INTRAMUSCULAR; INTRAVENOUS
Status: DISCONTINUED
Start: 2024-04-22 | End: 2024-04-22 | Stop reason: WASHOUT

## 2024-04-22 RX ORDER — HYDROMORPHONE HYDROCHLORIDE 2 MG/ML
0.2 INJECTION, SOLUTION INTRAMUSCULAR; INTRAVENOUS; SUBCUTANEOUS EVERY 5 MIN PRN
Status: DISCONTINUED | OUTPATIENT
Start: 2024-04-22 | End: 2024-04-22

## 2024-04-22 RX ORDER — CEFAZOLIN SODIUM 2 G/50ML
2 SOLUTION INTRAVENOUS
Status: DISCONTINUED | OUTPATIENT
Start: 2024-04-22 | End: 2024-04-22

## 2024-04-22 RX ORDER — SODIUM CHLORIDE 9 MG/ML
INJECTION, SOLUTION INTRAVENOUS CONTINUOUS
Status: DISCONTINUED | OUTPATIENT
Start: 2024-04-22 | End: 2024-04-22

## 2024-04-22 RX ORDER — ONDANSETRON HYDROCHLORIDE 2 MG/ML
INJECTION, SOLUTION INTRAMUSCULAR; INTRAVENOUS
Status: DISCONTINUED | OUTPATIENT
Start: 2024-04-22 | End: 2024-04-22

## 2024-04-22 RX ORDER — HYDROCODONE BITARTRATE AND ACETAMINOPHEN 5; 325 MG/1; MG/1
1 TABLET ORAL EVERY 4 HOURS PRN
Status: DISCONTINUED | OUTPATIENT
Start: 2024-04-22 | End: 2024-04-22 | Stop reason: HOSPADM

## 2024-04-22 RX ORDER — DEXAMETHASONE SODIUM PHOSPHATE 4 MG/ML
INJECTION, SOLUTION INTRA-ARTICULAR; INTRALESIONAL; INTRAMUSCULAR; INTRAVENOUS; SOFT TISSUE
Status: DISCONTINUED | OUTPATIENT
Start: 2024-04-22 | End: 2024-04-22

## 2024-04-22 RX ORDER — OXYCODONE HYDROCHLORIDE 5 MG/1
5 TABLET ORAL
Status: DISCONTINUED | OUTPATIENT
Start: 2024-04-22 | End: 2024-04-22

## 2024-04-22 RX ORDER — GABAPENTIN 300 MG/1
300 CAPSULE ORAL
Status: COMPLETED | OUTPATIENT
Start: 2024-04-22 | End: 2024-04-22

## 2024-04-22 RX ORDER — FENTANYL CITRATE 50 UG/ML
INJECTION, SOLUTION INTRAMUSCULAR; INTRAVENOUS
Status: DISCONTINUED | OUTPATIENT
Start: 2024-04-22 | End: 2024-04-22

## 2024-04-22 RX ORDER — LIDOCAINE HYDROCHLORIDE 10 MG/ML
1 INJECTION, SOLUTION EPIDURAL; INFILTRATION; INTRACAUDAL; PERINEURAL ONCE
Status: DISCONTINUED | OUTPATIENT
Start: 2024-04-22 | End: 2024-04-22

## 2024-04-22 RX ORDER — TRAMADOL HYDROCHLORIDE 50 MG/1
50 TABLET ORAL
Status: COMPLETED | OUTPATIENT
Start: 2024-04-22 | End: 2024-04-22

## 2024-04-22 RX ORDER — BUPIVACAINE HYDROCHLORIDE 5 MG/ML
INJECTION, SOLUTION EPIDURAL; INTRACAUDAL
Status: DISCONTINUED
Start: 2024-04-22 | End: 2024-04-22 | Stop reason: WASHOUT

## 2024-04-22 RX ORDER — HEPARIN SODIUM 5000 [USP'U]/ML
INJECTION, SOLUTION INTRAVENOUS; SUBCUTANEOUS
Status: COMPLETED
Start: 2024-04-22 | End: 2024-04-22

## 2024-04-22 RX ADMIN — HYDROMORPHONE HYDROCHLORIDE 0.2 MG: 2 INJECTION INTRAMUSCULAR; INTRAVENOUS; SUBCUTANEOUS at 01:04

## 2024-04-22 RX ADMIN — FENTANYL CITRATE 25 MCG: 50 INJECTION, SOLUTION INTRAMUSCULAR; INTRAVENOUS at 11:04

## 2024-04-22 RX ADMIN — SODIUM CHLORIDE: 9 INJECTION, SOLUTION INTRAVENOUS at 10:04

## 2024-04-22 RX ADMIN — FENTANYL CITRATE 25 MCG: 50 INJECTION, SOLUTION INTRAMUSCULAR; INTRAVENOUS at 10:04

## 2024-04-22 RX ADMIN — PROTAMINE SULFATE 20 MG: 10 INJECTION, SOLUTION INTRAVENOUS at 12:04

## 2024-04-22 RX ADMIN — ROPIVACAINE HYDROCHLORIDE 15 ML: 5 INJECTION, SOLUTION EPIDURAL; INFILTRATION; PERINEURAL at 11:04

## 2024-04-22 RX ADMIN — CEFAZOLIN 2 G: 330 INJECTION, POWDER, FOR SOLUTION INTRAMUSCULAR; INTRAVENOUS at 11:04

## 2024-04-22 RX ADMIN — ONDANSETRON 4 MG: 2 INJECTION INTRAMUSCULAR; INTRAVENOUS at 11:04

## 2024-04-22 RX ADMIN — GABAPENTIN 300 MG: 300 CAPSULE ORAL at 10:04

## 2024-04-22 RX ADMIN — TRAMADOL HYDROCHLORIDE 50 MG: 50 TABLET, COATED ORAL at 10:04

## 2024-04-22 RX ADMIN — DEXAMETHASONE SODIUM PHOSPHATE 4 MG: 4 INJECTION, SOLUTION INTRA-ARTICULAR; INTRALESIONAL; INTRAMUSCULAR; INTRAVENOUS; SOFT TISSUE at 11:04

## 2024-04-22 RX ADMIN — LIDOCAINE HYDROCHLORIDE 50 MG: 10 INJECTION, SOLUTION EPIDURAL; INFILTRATION; INTRACAUDAL; PERINEURAL at 11:04

## 2024-04-22 RX ADMIN — PROPOFOL 100 MG: 10 INJECTION, EMULSION INTRAVENOUS at 11:04

## 2024-04-22 RX ADMIN — HEPARIN SODIUM 4000 UNITS: 5000 INJECTION, SOLUTION INTRAVENOUS; SUBCUTANEOUS at 12:04

## 2024-04-22 RX ADMIN — ACETAMINOPHEN 1000 MG: 500 TABLET ORAL at 10:04

## 2024-04-22 NOTE — DISCHARGE SUMMARY
[facility] Surgical - Periop Services  Discharge Note  Short Stay    Procedure(s) (LRB):  INSERTION, GRAFT, ARTERIOVENOUS / Left /supraclavicular block (Left)      OUTCOME: Patient tolerated treatment/procedure well without complication and is now ready for discharge.    DISPOSITION: Home or Self Care    FINAL DIAGNOSIS:  End stage renal disease    FOLLOWUP: In clinic    DISCHARGE INSTRUCTIONS:    Discharge Procedure Orders   [facility] general     Keep surgical extremity elevated     Wound care routine (specify)   Order Comments: Leave dermabond in place until it falls off;  Ok to wash with soap under running water but do not submerge.  Do not use antibiotics ointment.     Call MD for:  temperature >100.4     Call MD for:  redness, tenderness, or signs of infection (pain, swelling, redness, odor or green/yellow discharge around incision site)     Activity as tolerated        TIME SPENT ON DISCHARGE: 5 minutes

## 2024-04-22 NOTE — ANESTHESIA POSTPROCEDURE EVALUATION
Anesthesia Post Evaluation    Patient: Jyoti Ruby    Procedure(s) Performed: Procedure(s) (LRB):  INSERTION, GRAFT, ARTERIOVENOUS / Left /supraclavicular block (Left)    Final Anesthesia Type: general      Patient location during evaluation: PACU  Patient participation: Yes- Able to Participate  Level of consciousness: awake and alert  Post-procedure vital signs: reviewed and stable  Pain management: adequate  Airway patency: patent    PONV status at discharge: No PONV  Anesthetic complications: no      Cardiovascular status: hemodynamically stable  Respiratory status: unassisted  Hydration status: euvolemic  Follow-up not needed.              Vitals Value Taken Time   /60 04/22/24 1400   Temp 36.6 °C (97.9 °F) 04/22/24 1400   Pulse 49 04/22/24 1400   Resp 17 04/22/24 1400   SpO2 96 % 04/22/24 1400         No case tracking events are documented in the log.      Pain/Gaston Score: Pain Rating Prior to Med Admin: 6 (4/22/2024  1:44 PM)  Gaston Score: 8 (4/22/2024  2:00 PM)

## 2024-04-22 NOTE — TRANSFER OF CARE
Anesthesia Transfer of Care Note    Patient: Jyoti Ruby    Procedure(s) Performed: Procedure(s) (LRB):  INSERTION, GRAFT, ARTERIOVENOUS / Left /supraclavicular block (Left)    Patient location: PACU    Anesthesia Type: general    Transport from OR: Transported from OR on room air with adequate spontaneous ventilation    Post pain: adequate analgesia    Post assessment: no apparent anesthetic complications    Post vital signs: stable    Level of consciousness: responds to stimulation    Nausea/Vomiting: no nausea/vomiting    Complications: none    Transfer of care protocol was followed      Last vitals: Visit Vitals  BP (!) 210/81   Pulse 62   Temp 37.1 °C (98.8 °F) (Oral)   Resp 20   Ht 5' (1.524 m)   Wt 83.5 kg (184 lb 1.4 oz)   SpO2 97%   Breastfeeding No   BMI 35.95 kg/m²

## 2024-04-22 NOTE — ANESTHESIA PROCEDURE NOTES
Intubation    Date/Time: 4/22/2024 11:40 AM    Performed by: Jackelyn Harris CRNA  Authorized by: Aubree Armstrong MD    Intubation:     Induction:  Intravenous    Intubated:  Postinduction    Mask Ventilation:  Easy mask    Attempts:  1    Attempted By:  CRNA    Method of Intubation:  Direct    Difficult Airway Encountered?: No      Complications:  None    Airway Device:  Supraglottic airway/LMA    Airway Device Size:  4.0    Style/Cuff Inflation:  Cuffed (inflated to minimal occlusive pressure)    Inflation Amount (mL):  30    Secured at:  The lips    Placement Verified By:  Capnometry    Complicating Factors:  None    Findings Post-Intubation:  BS equal bilateral

## 2024-04-22 NOTE — OP NOTE
Surgeon: Cate Bass MD    Date: 04/22/2024     Diagnosis: Pre-Op Diagnosis Codes:     * ESRD (end stage renal disease) [N18.6]     Procedure:   Left upper arm dialysis graft placement.  Left arm fistula ligation      History of Presenting Illness: Ms. Ruby is a 84 y.o. year old female who has end stage renal disease and needs long term hemodialysis access.      Procedure:  The patient was taken to the operating room and placed in a supine position.  The Left arm was prepped and draped in the usual sterile fashion.  Antibiotics were administered and appropriate time-out was performed.  B-mode ultrasound was performed on the extremity.  An incision was made in the axilla over the artery and vein.  Dissection was performed down to the proximal brachial artery and vein and each were isolated.  A 7 mm tapered [place]-Jon PTFE graft was selected.  This was tunneled in a loop fashion with the assistance of a counter incision just proximal to the antecubital fossa.   This counter incision was also used to ligate the proximal aspect of the previous fistula.  That fistula remained with some flow/pulsatility through a long segment of the previous fistula.   The patient expressed concern about this preoperatively and I told her I would ligate it more proximally.  The the proximal fistula was identified and 2-0 silk was placed around the proximal aspect of the brachiocephalic fistula.  The silk was tied down thereby occluding the fistula more proximally.  There was still a good radial pulse after fistula ligation.   During tunneling the distal end of the graft had to be tunneled deep to the aneurysmal segment of the distal aspect of the cephalic fistula.    Heparin was administered.  The brachial artery was clamped and arteriotomy was made.  An end-to-side anastomosis was made to the tapered end of the graft utilizing running [place]-Jon CV6 suture.  We then flushed the graft and it had good arterial flow.  We spatulated  the venous end of the graft, clamped the vein and made a venotomy.  End-to-side anastomosis was then made with a [place]-Jon CV6 suture.  Flow was allowed through the system and there was a good thrill.  Hemostasis was obtained.  The two wounds were closed with 3-0 Vicryl suture and 4-0 Monocryl suture.  Dermabond was applied.    Complications:  none    Findings: Good thrill to the graft;  good radial and ulnar artery signals after graft placement.

## 2024-04-22 NOTE — ANESTHESIA PROCEDURE NOTES
Peripheral Block    Patient location during procedure: pre-op   Block not for primary anesthetic.  Reason for block: at surgeon's request and post-op pain management   Post-op Pain Location: Left UE   Start time: 4/22/2024 11:05 AM  Timeout: 4/22/2024 10:58 AM   End time: 4/22/2024 11:07 AM    Staffing  Authorizing Provider: Aubree Armstrong MD  Performing Provider: Aubree Armstrong MD    Staffing  Performed by: Aubree Armstrong MD  Authorized by: Aubree Armstrong MD    Preanesthetic Checklist  Completed: patient identified, IV checked, site marked, risks and benefits discussed, surgical consent, monitors and equipment checked, pre-op evaluation and timeout performed  Peripheral Block  Patient position: sitting  Prep: ChloraPrep  Patient monitoring: heart rate, cardiac monitor, continuous pulse ox, continuous capnometry and frequent blood pressure checks  Block type: interscalene  Laterality: left  Injection technique: single shot  Needle  Needle type: Stimuplex   Needle gauge: 22 G  Needle length: 2 in  Needle localization: anatomical landmarks and ultrasound guidance   -ultrasound image captured on disc.  Assessment  Injection assessment: negative aspiration, negative parasthesia and local visualized surrounding nerve  Paresthesia pain: none  Heart rate change: no  Slow fractionated injection: yes  Pain Tolerance: comfortable throughout block and no complaints  Medications:    Medications: ropivacaine (NAROPIN) injection 0.5% - Perineural   15 mL - 4/22/2024 11:06:00 AM    Additional Notes  VSS.  [facility] RN monitoring vitals throughout procedure.  Patient tolerated procedure well.     1:400,000 epi

## 2024-04-22 NOTE — PLAN OF CARE
Pt is aaox4-vss-pain controlled and viwfjyntv-brw-xrbelhs score 8/10-she meets criteria for phase2 care per dr lama-to rm 14 via stretcher danette day

## 2024-04-23 VITALS
TEMPERATURE: 98 F | DIASTOLIC BLOOD PRESSURE: 68 MMHG | BODY MASS INDEX: 36.14 KG/M2 | HEART RATE: 57 BPM | RESPIRATION RATE: 18 BRPM | OXYGEN SATURATION: 96 % | WEIGHT: 184.06 LBS | SYSTOLIC BLOOD PRESSURE: 171 MMHG | HEIGHT: 60 IN

## 2024-04-26 ENCOUNTER — TELEPHONE (OUTPATIENT)
Dept: VASCULAR SURGERY | Facility: CLINIC | Age: 84
End: 2024-04-26
Payer: MEDICARE

## 2024-04-26 NOTE — Clinical Note
Called Christina back, let her know we will have to wait until Dr Hsu is back next week. She understood.       vein was performed. A percutaneous stick to the left  arm was performed. Ultrasound guidance was used to obtain access.

## 2024-05-20 NOTE — PROGRESS NOTES
[facility] Vascular - Clinic Note  Cate Bass MD      Patient Name: Jyoti Ruby                   : 1940     MRN: [id number]   Visit Date: 2024          History Present Illness     Reason for Visit: Post-Operative Follow up     Ms. Ruby presents to the clinic for 4 week follow up s/p left arm fistula ligation with upper arm graft placement 24. She denies fevers, drainage from her left upper arm incisions, and worsening pain or swelling at the site.  She reports intermittent and fatigue to her left hand. She is currently dialyzing through a right chest wall tunneled catheter without difficulty. She reports itching around         REVIEW OF SYSTEMS:  ROS  12 point review of systems conducted, negative except as stated in the history of present illness. See HPI for details.        Physical Exam      Vitals:    24 1348   BP: (!) 176/84   BP Location: Right arm   Pulse: 60   Weight: 82.6 kg (182 lb)   Height: 5' (1.524 m)        General: well-nourished, no acute distress, and healthy appearing, ambulating normally, alert, pleasant, conversant, and oriented  Respiratory: breathing easily and without respiratory distress  Abdomen: normal and soft  Cardiology: regular rate and rhythm    Upper Extremity Arterial Exam:   Left - radial is palpable    Dialysis Access:  right chest wall tunneled catheter  left upper arm graft  Assessment: mild swelling, good thrill    Musculoskeletal:   Upper Extremity: normal bilateral hand function and normal bilateral hand sensation, left hand is warm  Lower Extremity:     Post Operative Incision:   Location: left upper arm   clean, dry, and healing appropriately              Assessment and Plan     Ms. Ruby is a 84 y.o. with end stage renal disease who is s/p left upper arm graft placement with ligation of her previous left upper arm fistula on 24.  On exam, access has a good thrill and no pulsatility. OK to begin cannulation on 2024  starting with 17g needles for 2 weeks, then progress needle size as tolerated. Once her new access has functioned reliably for 2 weeks or more, OK to remove right chest wall  tunneled catheter.        1. ESRD (end stage renal disease)           Imaging Obtained/Reviewed   Study: none  Date:              Medical History     Past Medical History:   Diagnosis Date    Arthritis     Back pain     Dialysis complication     ESRD (end stage renal disease)     on HD    HLD (hyperlipidemia)     Hypertension     Multiple myeloma 2013    Obesity, unspecified      Past Surgical History:   Procedure Laterality Date    ARTERIOVENOUS FISTULOGRAPHY Left 08/14/2023    Procedure: FISTULOGRAM, ARTERIOVENOUS;  Surgeon: Deshawn Rangel DO;  Location: [facility] CATH LAB;  Service: Nephrology;  Laterality: Left;    ARTERIOVENOUS FISTULOGRAPHY Left 03/13/2024    Procedure: FISTULOGRAM-AV;  Surgeon: Deshawn Rangel DO;  Location: [facility] CATH LAB;  Service: Nephrology;  Laterality: Left;    AV FISTULA PLACEMENT Left 01/31/2020    L BC AVF - Dr. Bass    BACK SURGERY      herniated disc    FISTULOGRAM Left 12/29/2023    Procedure: Fistulogram;  Surgeon: Cate Bass MD;  Location: [facility] CATH LAB;  Service: Peripheral Vascular;  Laterality: Left;    HYSTERECTOMY      INSERTION OF TUNNELED CENTRAL VENOUS HEMODIALYSIS CATHETER N/A 03/13/2024    Procedure: Insertion, Catheter, Central Venous, Hemodialysis;  Surgeon: Deshawn Ranegl DO;  Location: [facility] CATH LAB;  Service: Nephrology;  Laterality: N/A;    PLACEMENT OF ARTERIOVENOUS GRAFT Left 4/22/2024    Procedure: INSERTION, GRAFT, ARTERIOVENOUS / Left /supraclavicular block;  Surgeon: Cate Bass MD;  Location: [facility];  Service: Peripheral Vascular;  Laterality: Left;  supraclavicular block     No family history on file.  Social History     Socioeconomic History    Marital status: Single   Tobacco Use    Smoking status: Never    Smokeless tobacco: Never   Substance and Sexual Activity     Alcohol use: Not Currently    Drug use: Never    Sexual activity: Not Currently     Social Determinants of Health     Financial Resource Strain: Low Risk  (2/21/2024)    [facility], [facility] and [facility]    Overall Financial Resource Strain (CARDIA)     Difficulty of Paying Living Expenses: Not very hard   Food Insecurity: No Food Insecurity (2/21/2024)    [facility], [facility]    Hunger Vital Sign     Worried About Running Out of Food in the Last Year: Never true     Ran Out of Food in the Last Year: Never true   Transportation Needs: No Transportation Needs (2/21/2024)    [facility], [facility]    PRAPARE - Transportation     Lack of Transportation (Medical): No     Lack of Transportation (Non-Medical): No   Physical Activity: Inactive (2/21/2024)    [facility], [facility] and [facility]    Exercise Vital Sign     Days of Exercise per Week: 0 days     Minutes of Exercise per Session: 0 min   Stress: Stress Concern Present (2/21/2024)    [facility], [facility]    [employer] - Occupational Stress Questionnaire     Feeling of Stress : To some extent   Housing Stability: Low Risk  (2/21/2024)    Received from  [facility] and Affiliates, [facility] and Aff[facility]ates    Housing Stability Vital Sign     Unable to Pay for Housing in the Last Year: No     Number of Places Lived in the Last Year: 1     In the last 12 months, was there a time when you did not have a steady place to sleep or slept in a shelter (including now)?: No     Current Outpatient Medications   Medication Instructions    amLODIPine (NORVASC) 5 mg, Oral, Daily    calcitRIOL (ROCALTROL) 0.25 mcg, Oral, Daily    cloNIDine (CATAPRES) 0.1 mg, Oral, Once    folic acid-vit B6-vit B12 2.5-25-2 mg (FOLBIC) 2.5-25-2 mg Tab 1 tablet, Oral, Daily    labetaloL (NORMODYNE) 200 mg, Oral, 2 times daily    losartan (COZAAR) 100 mg, Oral, 2 times daily    sevelamer carbonate (RENVELA) 1,600 mg, Oral, 3 times daily with meals    sodium bicarbonate 650 mg, Oral, 2 times daily     Review of patient's allergies indicates:   Allergen Reactions    Glimepiride      HEADACHE       Patient Care Team:  Angelica Sal Jr. as PCP - General (Internal Medicine)  [facility] as Dialysis Nurse (Dialysis Center)  Torres Styles MD as Consulting Physician (Nephrology)  Sonia Griffin MD as Consulting Physician (Interventional Cardiology)      No follow-ups on file. In addition to their scheduled follow up, the patient has also been instructed to follow up on as needed basis.     No future appointments.

## 2024-05-21 ENCOUNTER — OFFICE VISIT (OUTPATIENT)
Dept: VASCULAR SURGERY | Facility: CLINIC | Age: 84
End: 2024-05-21
Payer: MEDICARE

## 2024-05-21 VITALS
HEIGHT: 60 IN | WEIGHT: 182 LBS | HEART RATE: 60 BPM | SYSTOLIC BLOOD PRESSURE: 176 MMHG | DIASTOLIC BLOOD PRESSURE: 84 MMHG | BODY MASS INDEX: 35.73 KG/M2

## 2024-05-21 DIAGNOSIS — N18.6 ESRD (END STAGE RENAL DISEASE): Primary | ICD-10-CM

## 2024-05-21 PROCEDURE — 99024 POSTOP FOLLOW-UP VISIT: CPT | Mod: POP,,, | Performed by: SPECIALIST

## 2024-06-18 ENCOUNTER — TELEPHONE (OUTPATIENT)
Dept: CARDIOLOGY | Facility: HOSPITAL | Age: 84
End: 2024-06-18
Payer: MEDICARE

## 2024-06-18 NOTE — TELEPHONE ENCOUNTER
Ms. Ruby is scheduled for catheter removal tomorrow. Contacted Dialysis unit for an update with access function. She is s/p Left arm AVG placement on 4/22/24. Cannulations started on 6/4/24 with 17 g needles and they have not used catheter since then. Today, they began using 16 g needles and are not having any cannulation difficulties at all. The center expresses confidence with AVG function and readiness for catheter removal. Recent clearance on 6/4/24 was 1.32.  [place]

## 2024-06-19 ENCOUNTER — HOSPITAL ENCOUNTER (OUTPATIENT)
Facility: HOSPITAL | Age: 84
Discharge: HOME | End: 2024-06-19
Attending: STUDENT IN AN ORGANIZED HEALTH CARE EDUCATION/TRAINING PROGRAM | Admitting: STUDENT IN AN ORGANIZED HEALTH CARE EDUCATION/TRAINING PROGRAM
Payer: MEDICARE

## 2024-06-19 VITALS
OXYGEN SATURATION: 96 % | WEIGHT: 184.75 LBS | HEART RATE: 66 BPM | SYSTOLIC BLOOD PRESSURE: 168 MMHG | BODY MASS INDEX: 36.27 KG/M2 | HEIGHT: 60 IN | TEMPERATURE: 98 F | DIASTOLIC BLOOD PRESSURE: 84 MMHG

## 2024-06-19 DIAGNOSIS — N18.6 ESRD (END STAGE RENAL DISEASE): ICD-10-CM

## 2024-06-19 PROCEDURE — 36589 REMOVAL TUNNELED CV CATH: CPT | Mod: ,,, | Performed by: STUDENT IN AN ORGANIZED HEALTH CARE EDUCATION/TRAINING PROGRAM

## 2024-06-19 PROCEDURE — 36589 REMOVAL TUNNELED CV CATH: CPT | Performed by: STUDENT IN AN ORGANIZED HEALTH CARE EDUCATION/TRAINING PROGRAM

## 2024-06-19 PROCEDURE — 25000003 PHARM REV CODE 250: Performed by: STUDENT IN AN ORGANIZED HEALTH CARE EDUCATION/TRAINING PROGRAM

## 2024-06-19 PROCEDURE — 99213 OFFICE O/P EST LOW 20 MIN: CPT | Mod: 25,,, | Performed by: STUDENT IN AN ORGANIZED HEALTH CARE EDUCATION/TRAINING PROGRAM

## 2024-06-19 RX ORDER — LIDOCAINE HYDROCHLORIDE 10 MG/ML
INJECTION INFILTRATION; PERINEURAL
Status: DISCONTINUED | OUTPATIENT
Start: 2024-06-19 | End: 2024-06-19 | Stop reason: HOSPADM

## 2024-06-19 RX ORDER — LIDOCAINE HYDROCHLORIDE 10 MG/ML
10 INJECTION INFILTRATION; PERINEURAL ONCE
Status: DISCONTINUED | OUTPATIENT
Start: 2024-06-19 | End: 2024-06-19 | Stop reason: HOSPADM

## 2024-06-19 NOTE — H&P
INTERVENTIONAL NEPHROLOGY HISTORY & PHYSICAL       Patient Name: Jyoti Ruby  KONRAD 1940    Date: 2024  Time: 1:39 PM         HPI: 84 y.o. female with ESRD on HD via left brachioaxillary arteriovenous graft who presents with need for tunneled dialysis catheter removal. Pt has been tolerating cannulation of her graft without issues. . Pt is being prepared for tunneled dialysis catheter removal today.    [facility] Family is present. Risks and benefits of tunneled dialysis catheter removal was reviewed with the patient. The patient agrees to proceed with the intended procedure.     Review of Systems:  General:  No fatigue  Skin: No rashes  HEENT: No vision changes  CVS: No CP  RS: No SOB  GIT: No abdominal pain  Extremities: No swelling  Neurological:  No focal weakness  Psych: No depression    Past Medical History:   Diagnosis Date    Arthritis     Back pain     Dialysis complication     ESRD (end stage renal disease)     on HD    HLD (hyperlipidemia)     Hypertension     Multiple myeloma     Obesity, unspecified       Past Surgical History:   Procedure Laterality Date    ARTERIOVENOUS FISTULOGRAPHY Left 2023    Procedure: FISTULOGRAM, ARTERIOVENOUS;  Surgeon: Deshawn Rangel DO;  Location: [facility] CATH LAB;  Service: Nephrology;  Laterality: Left;    ARTERIOVENOUS FISTULOGRAPHY Left 2024    Procedure: FISTULOGRAM-AV;  Surgeon: Deshawn Rangel DO;  Location: [facility] CATH LAB;  Service: Nephrology;  Laterality: Left;    AV FISTULA PLACEMENT Left 2020    L BC AVF - Dr. Bass    BACK SURGERY      herniated disc    FISTULOGRAM Left 2023    Procedure: Fistulogram;  Surgeon: Cate Bass MD;  Location: [facility] CATH LAB;  Service: Peripheral Vascular;  Laterality: Left;    HYSTERECTOMY      INSERTION OF TUNNELED CENTRAL VENOUS HEMODIALYSIS CATHETER N/A 2024    Procedure: Insertion, Catheter, Central Venous, Hemodialysis;  Surgeon:  Deshawn Rangel DO;  Location: [facility] CATH LAB;  Service: Nephrology;  Laterality: N/A;    PLACEMENT OF ARTERIOVENOUS GRAFT Left 4/22/2024    Procedure: INSERTION, GRAFT, ARTERIOVENOUS / Left /supraclavicular block;  Surgeon: Cate Bass MD;  Location: [facility] OR;  Service: Peripheral Vascular;  Laterality: Left;  supraclavicular block      Review of patient's allergies indicates:   Allergen Reactions    Glimepiride      HEADACHE      Social History     Tobacco Use    Smoking status: Never    Smokeless tobacco: Never   Substance Use Topics    Alcohol use: Not Currently    Drug use: Never      No family history on file.      Current Facility-Administered Medications:     LIDOcaine HCL 10 mg/ml (1%) injection 10 mL, 10 mL, Intradermal, Once, Deshawn Rangel DO    Vital Signs:  Temp:  [98.2 °F (36.8 °C)] 98.2 °F (36.8 °C)  Pulse:  [73] 73  SpO2:  [96 %] 96 %  BP: (203)/(68) 203/68     Physical Exam:  General: NAD  HEENT: NC/AT, EOMI  CVS: RRR.  RS: breathing easily.  Abdominal: Soft, NT/ND.  Extremities: No edema b/l LE  Skin: No rash, no lesions.  Neurological: No focal deficits.  Psych: Normal affect  Dialysis Access: right internal jugular (RIJ)/right chest wall (RCW) tunneled dialysis catheter (TDC) without signs of bleeding/infection.    Results:    Lab Results   Component Value Date     04/11/2024     (L) 01/30/2024    K 3.3 (L) 04/11/2024    K 4.0 01/30/2024    CL 96 (L) 04/11/2024    CL 94 (L) 01/30/2024    CO2 30 04/11/2024    CO2 26 01/30/2024    BUN 20.9 (H) 04/11/2024    BUN 27 (H) 01/30/2024    CREATININE 3.40 (H) 04/11/2024    CREATININE 4.95 (H) 01/30/2024     Lab Results   Component Value Date    WBC 4.94 04/11/2024    HGB 11.3 (L) 04/11/2024     04/11/2024    MCV 93.6 04/11/2024       Assessment and Plan:      ESRD on HD via left brachioaxillary arteriovenous graft.  Need for tunneled dialysis catheter removal.  Pt with ESRD on HD via left brachioaxillary arteriovenous graft who  presents today with need for tunneled dialysis catheter removal. The pt is being prepared for tunneled dialysis catheter removal today.  - Consents obtained and placed within chart.  - Will proceed in CVSS setting today.    Please feel free to reach me with any questions.    Deshawn Rangel DO  Interventional Nephrology  Cell: [phone]     General

## 2024-06-19 NOTE — PROCEDURES
INTERVENTIONAL NEPHROLOGY PROCEDURE NOTE:   TUNNELED DIALYSIS CATHETER (TDC) REMOVAL       Patient Name: Jyoti Ruby  KONRAD 1940    Procedure Date:    2024    Performing Physician:   Dr. Rangel    Access History: Pt is with ESRD requiring HD.    Pre-Op Diagnosis: N18.6 End Stage Renal Disease (ESRD).  Post-Op Diagnosis: Same    Procedure: Tunneled dialysis catheter removal.    Indication: ESRD requiring HD now with mature access.    Anatomical Site: right internal jugular (RIJ)/right chest wall (RCW)    Informed Consent:  The patient was evaluated in the pre-operative area with assessment including the American Society of Anesthesia risk classification. The procedure is discussed in detail including risks, benefits alternatives and options and the patient agrees to proceed. Informed consent was obtained from the patient.     Maximum sterile barrier technique: The patient was prepped and draped using chlorhexidine prep and maximum sterile barrier technique.    Procedure Steps:   The original dressing overlying the catheter was removed. The patient was prepped and draped in sterile fashion. The catheter was gently pulled to assess if the catheter cuff was already released. It was found to be stationed well within the tunnel. There were no obvious signs of infection.    The suture anchoring the catheter hub to the skin was removed. The area, specifically the exit site and tunnel of the catheter was infiltrated with 1% lidocaine. Moments later, blunt dissection with mosquito-tipped hemostats was completed to free the internalized tunneled catheter cuff. Once found to be freely moveable, the catheter was removed in a smooth motion. Pressure was held at the suspected venotomy site for ten minutes and hemostasis was found to be achieved. A folded 4 x 4 gauze was placed at the exit site, with a Tegaderm applied over to supply as a dressing.    ASSESSMENT/PLAN:  - Successful removal of right internal  jugular (RIJ)/right chest wall (RCW) tunneled dialysis catheter.    EBL: 2 ml    Complications: None    Thank you for allowing me the opportunity in taking care of this patient. Please reach me with any questions.    Deshawn Rangel DO  Interventional Nephrology  Cell: [phone]

## 2024-06-19 NOTE — DISCHARGE SUMMARY
INTERVENTIONAL NEPHROLOGY DISCHARGE SUMMARY         Patient Name: Jyoti Ruby  KONRAD 1940    Procedure Date: 2024      In brief, Ms. Ruby underwent uneventful tunneled dialysis catheter removal.    Pre-Op Diagnosis: N18.6 End Stage Renal Disease (ESRD)  Post-Op Diagnosis: Same.    Discharge Instructions/Recommendations:  - Continue use of fistula for dialysis.  - Pt may be discharged after successful monitoring in post-op area.  - Pt may resume home medications.    Thank you for allowing me the opportunity in taking care of this patient. Please reach me with any questions.    Deshawn Rangel DO  Interventional Nephrology  Cell: [phone]

## 2024-06-19 NOTE — DISCHARGE INSTRUCTIONS
Keep site clean, dry  and covered for the next 5 days.  See attached for additional site care instructions.

## 2024-09-19 ENCOUNTER — TELEPHONE (OUTPATIENT)
Dept: CARDIOLOGY | Facility: HOSPITAL | Age: 84
End: 2024-09-19
Payer: MEDICARE

## 2024-09-19 NOTE — TELEPHONE ENCOUNTER
CARLOTTA Luna called this morning reporting access dysfunction regarding Ms. Ruby. They were unable to start treatment today after multiple cannulations attempts at the venous site. They were able to cannulate the arterial, however the venous stick had dark blood on aspiration and clotted lines. There is still a good thrill and bruit-per nurse assessment.     Ms. Ruby was contacted to schedule evaluation/procedure of dialysis access. She was given arrival time, directions, and pre operative instructions. She stated understanding. We will plan for procedure tomorrow, check BMP on arrival. Patient will need to resume dialysis treatment Saturday per normal schedule, unless labs indicate the need for urgent dialysis.

## 2024-09-20 ENCOUNTER — HOSPITAL ENCOUNTER (OUTPATIENT)
Facility: HOSPITAL | Age: 84
Discharge: HOME | End: 2024-09-20
Attending: STUDENT IN AN ORGANIZED HEALTH CARE EDUCATION/TRAINING PROGRAM | Admitting: STUDENT IN AN ORGANIZED HEALTH CARE EDUCATION/TRAINING PROGRAM
Payer: MEDICARE

## 2024-09-20 DIAGNOSIS — T82.590A MECHANICAL COMPLICATION OF ARTERIOVENOUS FISTULA SURGICALLY CREATED, INITIAL ENCOUNTER: ICD-10-CM

## 2024-09-20 LAB
ANION GAP SERPL CALC-SCNC: 16 MEQ/L
BUN SERPL-MCNC: 85.1 MG/DL (ref 9.8–20.1)
CALCIUM SERPL-MCNC: 8.3 MG/DL (ref 8.4–10.2)
CHLORIDE SERPL-SCNC: 101 MMOL/L (ref 98–107)
CO2 SERPL-SCNC: 20 MMOL/L (ref 23–31)
CREAT SERPL-MCNC: 11.49 MG/DL (ref 0.55–1.02)
CREAT/UREA NIT SERPL: 7
GFR SERPLBLD CREATININE-BSD FMLA CKD-EPI: 3 ML/MIN/1.73/M2
GLUCOSE SERPL-MCNC: 82 MG/DL (ref 82–115)
HBV SURFACE AG SERPL QL IA: NONREACTIVE
POTASSIUM SERPL-SCNC: 6 MMOL/L (ref 3.5–5.1)
SODIUM SERPL-SCNC: 137 MMOL/L (ref 136–145)

## 2024-09-20 PROCEDURE — 76937 US GUIDE VASCULAR ACCESS: CPT | Performed by: STUDENT IN AN ORGANIZED HEALTH CARE EDUCATION/TRAINING PROGRAM

## 2024-09-20 PROCEDURE — C1725 CATH, TRANSLUMIN NON-LASER: HCPCS | Performed by: STUDENT IN AN ORGANIZED HEALTH CARE EDUCATION/TRAINING PROGRAM

## 2024-09-20 PROCEDURE — 27201423 OPTIME MED/SURG SUP & DEVICES STERILE SUPPLY: Performed by: STUDENT IN AN ORGANIZED HEALTH CARE EDUCATION/TRAINING PROGRAM

## 2024-09-20 PROCEDURE — 36902 INTRO CATH DIALYSIS CIRCUIT: CPT | Mod: LT,,, | Performed by: STUDENT IN AN ORGANIZED HEALTH CARE EDUCATION/TRAINING PROGRAM

## 2024-09-20 PROCEDURE — 36415 COLL VENOUS BLD VENIPUNCTURE: CPT | Performed by: STUDENT IN AN ORGANIZED HEALTH CARE EDUCATION/TRAINING PROGRAM

## 2024-09-20 PROCEDURE — 80048 BASIC METABOLIC PNL TOTAL CA: CPT | Performed by: STUDENT IN AN ORGANIZED HEALTH CARE EDUCATION/TRAINING PROGRAM

## 2024-09-20 PROCEDURE — 63600175 PHARM REV CODE 636 W HCPCS: Performed by: STUDENT IN AN ORGANIZED HEALTH CARE EDUCATION/TRAINING PROGRAM

## 2024-09-20 PROCEDURE — G0257 UNSCHED DIALYSIS ESRD PT HOS: HCPCS

## 2024-09-20 PROCEDURE — 87340 HEPATITIS B SURFACE AG IA: CPT | Performed by: INTERNAL MEDICINE

## 2024-09-20 PROCEDURE — 36902 INTRO CATH DIALYSIS CIRCUIT: CPT | Mod: RT | Performed by: STUDENT IN AN ORGANIZED HEALTH CARE EDUCATION/TRAINING PROGRAM

## 2024-09-20 PROCEDURE — 25000003 PHARM REV CODE 250: Performed by: STUDENT IN AN ORGANIZED HEALTH CARE EDUCATION/TRAINING PROGRAM

## 2024-09-20 PROCEDURE — 25500020 PHARM REV CODE 255: Performed by: STUDENT IN AN ORGANIZED HEALTH CARE EDUCATION/TRAINING PROGRAM

## 2024-09-20 PROCEDURE — 80100016 HC MAINTENANCE HEMODIALYSIS

## 2024-09-20 PROCEDURE — C1769 GUIDE WIRE: HCPCS | Performed by: STUDENT IN AN ORGANIZED HEALTH CARE EDUCATION/TRAINING PROGRAM

## 2024-09-20 PROCEDURE — C1894 INTRO/SHEATH, NON-LASER: HCPCS | Performed by: STUDENT IN AN ORGANIZED HEALTH CARE EDUCATION/TRAINING PROGRAM

## 2024-09-20 PROCEDURE — 99152 MOD SED SAME PHYS/QHP 5/>YRS: CPT | Mod: ,,, | Performed by: STUDENT IN AN ORGANIZED HEALTH CARE EDUCATION/TRAINING PROGRAM

## 2024-09-20 PROCEDURE — 76937 US GUIDE VASCULAR ACCESS: CPT | Mod: 26,,, | Performed by: STUDENT IN AN ORGANIZED HEALTH CARE EDUCATION/TRAINING PROGRAM

## 2024-09-20 PROCEDURE — 99152 MOD SED SAME PHYS/QHP 5/>YRS: CPT | Performed by: STUDENT IN AN ORGANIZED HEALTH CARE EDUCATION/TRAINING PROGRAM

## 2024-09-20 RX ORDER — FENTANYL CITRATE 50 UG/ML
INJECTION, SOLUTION INTRAMUSCULAR; INTRAVENOUS
Status: DISCONTINUED | OUTPATIENT
Start: 2024-09-20 | End: 2024-09-20 | Stop reason: HOSPADM

## 2024-09-20 RX ORDER — SODIUM CHLORIDE 0.9 % (FLUSH) 0.9 %
10 SYRINGE (ML) INJECTION
Status: DISCONTINUED | OUTPATIENT
Start: 2024-09-20 | End: 2024-09-20 | Stop reason: HOSPADM

## 2024-09-20 RX ORDER — IOPAMIDOL 755 MG/ML
INJECTION, SOLUTION INTRAVASCULAR
Status: DISCONTINUED | OUTPATIENT
Start: 2024-09-20 | End: 2024-09-20 | Stop reason: HOSPADM

## 2024-09-20 RX ORDER — LIDOCAINE HYDROCHLORIDE 10 MG/ML
INJECTION, SOLUTION INFILTRATION; PERINEURAL
Status: DISCONTINUED | OUTPATIENT
Start: 2024-09-20 | End: 2024-09-20 | Stop reason: HOSPADM

## 2024-09-20 RX ORDER — MUPIROCIN 20 MG/G
OINTMENT TOPICAL 2 TIMES DAILY
OUTPATIENT
Start: 2024-09-20 | End: 2024-09-25

## 2024-09-20 RX ORDER — MIDAZOLAM HYDROCHLORIDE 1 MG/ML
INJECTION INTRAMUSCULAR; INTRAVENOUS
Status: DISCONTINUED | OUTPATIENT
Start: 2024-09-20 | End: 2024-09-20 | Stop reason: HOSPADM

## 2024-09-20 RX ORDER — HYDRALAZINE HYDROCHLORIDE 20 MG/ML
INJECTION INTRAMUSCULAR; INTRAVENOUS
Status: DISCONTINUED | OUTPATIENT
Start: 2024-09-20 | End: 2024-09-20 | Stop reason: HOSPADM

## 2024-09-20 NOTE — PROCEDURES
INTERVENTIONAL NEPHROLOGY PROCEDURE NOTE: FISTULOGRAM/GRAFTOGRAM         Patient Name: Jyoti Ruby  KONRAD 1940    Procedure Date:    2024    Performing Physician:   Dr. Rangel    Access History: Pt is with ESRD on HD typically via left brachioaxillary arteriovenous graft who presents today with difficult cannulation.    Pre-Op Diagnosis: T82.590A Mechanical complication of surgically created arteriovenous shunt, initial encounter, N18.6 End Stage Renal Disease (ESRD)  Post-Op Diagnosis: Same    Procedure: Fistulogram with possible angioplasty and stent placement.    Indication: Nonfunctional/Dysfunctional/Malfunctioning HD access.    Informed Consent:  The patient was evaluated in the pre-operative area with assessment including the American Society of Anesthesia risk classification. The procedure is discussed in detail including risks, benefits alternatives and options and the patient agrees to proceed. Informed consent was obtained from the patient.     Maximum sterile barrier technique: The patient was prepped and draped using chlorhexidine prep and maximum sterile barrier technique.    Sedation Note:  Risks and benefits of sedation were reviewed with the patient or surrogate, including bleeding, infection, nausea, vomiting, dizziness, instability, damage to a nerve, damage to a blood vessel, cellulitis, reaction to medications, amnesia, loss of consciousness, respiratory arrest, cardiac arrest.     The patient received the following medications: Versed 1 mg IV and Fentanyl 50 mcg IV; patient did remain alert, responsive, and conversational throughout the procedure. I was personally responsible for supervising the administration of moderate sedation services during the procedure performed and I affirm all the guidelines and requirements described in the CPT 2024 section on moderate sedation were followed, including the use of an independent trained observer who had no other duties during  the procedure. The total face-to-face time was 17 minutes.    Procedure Steps:     The patient was prepped and draped in sterile fashion. Procedure ultrasound revealed patent vascular HD access.    Local anesthesia was administered by injecting 1% lidocaine at the intended site of cannulation. With use of live ultrasonographic visualization, the vascular access was successfully cannulated with a mini-stick needle aimed towards the outflow (image saved to chart). After blood flashback was noted, the mini-stick wire was advanced through the needle. Via Seldinger technique, the needle was exchanged for the mini-stick sheath. Angiograms were completed which revealed 1 lesion(s) (summarized below). A 150 cm glide wire was advanced through the mini-stick sheath with the tip parked in the superior vena cava. A 6 Fr sheath was exchanged via Seldinger technique for the mini-stick sheath.    The aforementioned lesion(s) are as below, followed by their respective intervention(s) :  #1: Approximately 50% stenosis in the venous anastomosis (VA).  Angioplasty was performed at this location using a BD Conquest 8 mm x 60 mm balloon. Approximately 100% effacement was obtained at 15-20 SOHAIL and was held for 1-5 seconds. Post-angioplasty angiogram revealed 10% residual stenosis.  *Retrograde angiogram showed patent inflow segment without notable lesions.    Lesions were treated in the following order: #1.    Wire and sheath were removed and hemostasis was achieved using a purse-string stitch and light digital pressure at the site of cannulation.    ASSESSMENT/PLAN:  - Successful angioplasty of venous anastomosis.    EBL: 5 ml    Contrast: 45 ml    Complications: None    Post-op Instructions: The patient was given both verbal and written post-op instructions. If excessive bleeding at the site, they have been instructed to call their physician or proceed to a local emergency room.    Orders to the dialysis unit: OK to use access for  dialysis needs.    Thank you for allowing me the opportunity in taking care of this patient. Please reach me with any questions.    Deshawn Rangel,   Interventional Nephrology  Cell: [phone]

## 2024-09-20 NOTE — Clinical Note
The balloon was inflated with indeflator in the  . The balloon max pressure was 15 yue for 18 seconds

## 2024-09-20 NOTE — H&P
INTERVENTIONAL NEPHROLOGY HISTORY & PHYSICAL       Patient Name: Jyoti Boregsashwin [birth year]ILVESTRE 1940    Date: 2024  Time: 8:02 AM         HPI: 84 y.o. female with ESRD on HD via left brachioaxillary arteriovenous graft who presents with difficult cannulation. Pt was attempted at having HD treatment yesterday at her center, however her venous cannulation was not successful. A bruit and thrill were appreciated despite this. Pt is being prepared for graftogram with possible intervention today.    Pt seen and examined at bedside in [facility] this AM. Family is present. Risks and benefits of graftogram with possible intervention and intravenous conscious sedation was reviewed with the patient. The patient agrees to proceed with the intended procedure. Consents for both intravenous conscious sedation and procedure were signed and placed within the chart.     Pt denies history of contrast allergy, however has stated that she has a shellfish allergy. She has undergone a few fistulograms with our service as an outpatient and inpatient without complication. Despite this, we will plan to pre-medicate with Benadryl 50 mg IV before case initiation.    Review of Systems:  General:  No fatigue  Skin: No rashes  HEENT: No vision changes  CVS: No CP  RS: No SOB  GIT: No abdominal pain  Extremities: No swelling  Neurological:  No focal weakness  Psych: No depression    Past Medical History:   Diagnosis Date    Arthritis     Back pain     Dialysis complication     ESRD (end stage renal disease)     on HD    HLD (hyperlipidemia)     Hypertension     Multiple myeloma     Obesity, unspecified       Past Surgical History:   Procedure Laterality Date    ARTERIOVENOUS FISTULOGRAPHY Left 2023    Procedure: FISTULOGRAM, ARTERIOVENOUS;  Surgeon: Deshawn Rangel DO;  Location: [facility] CATH LAB;  Service: Nephrology;  Laterality: Left;    ARTERIOVENOUS FISTULOGRAPHY Left 2024    Procedure: FISTULOGRAM-AV;  Surgeon: Juan Carlos  DO Deshawn;  Location: [facility] CATH LAB;  Service: Nephrology;  Laterality: Left;    AV FISTULA PLACEMENT Left 01/31/2020    L BC AVF - Dr. Bass    BACK SURGERY      herniated disc    FISTULOGRAM Left 12/29/2023    Procedure: Fistulogram;  Surgeon: Cate Bass MD;  Location: [facility] CATH LAB;  Service: Peripheral Vascular;  Laterality: Left;    HYSTERECTOMY      INSERTION OF TUNNELED CENTRAL VENOUS HEMODIALYSIS CATHETER N/A 03/13/2024    Procedure: Insertion, Catheter, Central Venous, Hemodialysis;  Surgeon: Deshawn Rangel DO;  Location: [facility] CATH LAB;  Service: Nephrology;  Laterality: N/A;    PLACEMENT OF ARTERIOVENOUS GRAFT Left 4/22/2024    Procedure: INSERTION, GRAFT, ARTERIOVENOUS / Left /supraclavicular block;  Surgeon: Cate Bass MD;  Location: [facility];  Service: Peripheral Vascular;  Laterality: Left;  supraclavicular block    REMOVAL OF TUNNELED CENTRAL VENOUS CATHETER (CVC) Right 6/19/2024    Procedure: REMOVAL, CATHETER, CENTRAL VENOUS, TUNNELED;  Surgeon: Deshawn Rangel DO;  Location: [facility] CATH LAB;  Service: Nephrology;  Laterality: Right;      Review of patient's allergies indicates:   Allergen Reactions    Shellfish containing products Swelling    Glimepiride      HEADACHE      Social History     Tobacco Use    Smoking status: Never    Smokeless tobacco: Never   Substance Use Topics    Alcohol use: Not Currently    Drug use: Never      No family history on file.      Current Facility-Administered Medications:     sodium chloride 0.9% flush 10 mL, 10 mL, Intravenous, PRN, Deshawn Rangel DO    Vital Signs:  Temp:  [98.1 °F (36.7 °C)] 98.1 °F (36.7 °C)  Pulse:  [63] 63  SpO2:  [97 %] 97 %  BP: (208)/(77) 208/77     Physical Exam:  General: NAD  HEENT: NC/AT, EOMI  CVS: RRR.  RS: breathing easily.  Abdominal: Soft, NT/ND.  Extremities: No edema b/l LE  Skin: No rash, no lesions.  Neurological: No focal deficits.  Psych: Normal affect  Dialysis Access: left brachioaxillary arteriovenous  graft with good thrill. There is an area of induration at the distal graft with accompanying bruising.     Results:    Lab Results   Component Value Date     09/20/2024     (L) 07/15/2024    K 6.0 (H) 09/20/2024    K 4.6 07/15/2024     09/20/2024    CL 93 (L) 07/15/2024    CO2 20 (L) 09/20/2024    CO2 25 07/15/2024    BUN 85.1 (H) 09/20/2024    BUN 75 (H) 07/15/2024    CREATININE 11.49 (H) 09/20/2024    CREATININE 7.30 (H) 07/15/2024     Lab Results   Component Value Date    WBC 4.94 04/11/2024    HGB 11.3 (L) 04/11/2024     04/11/2024    MCV 93.6 04/11/2024       Assessment and Plan:      ESRD on HD via left brachioaxillary arteriovenous graft.  Mechanical complication of AVG.  Hyperkalemia.  Pt with ESRD on HD via left brachioaxillary arteriovenous graft who presents today with difficult cannulation. The pt is being prepared for graftogram with possible intervention today.  - Consents obtained and placed within chart.  - Will proceed in cath lab setting today.  - Pt with hyperkalemia on labs today. She will require dialysis after intervention.    Please feel free to reach me with any questions.    Deshawn Rangel DO  Interventional Nephrology  Cell: [phone]

## 2024-09-20 NOTE — PROGRESS NOTES
09/20/24 1459   Post-Hemodialysis Assessment   Blood Volume Processed (Liters) 45 L   Dialyzer Clearance Moderately streaked   Duration of Treatment 180 minutes   Total UF (mL) 2000 mL   Patient Response to Treatment Pt tolerated well. Tx decreased to 2 hrs per pt request. Dr. Styles aware and ok with decision. 2 liters of fluid was removed. Access was functional with no issues

## 2024-09-20 NOTE — Clinical Note
An angiogram was performed Lesion documentation: Left upper arm veins, Left arm AVG, and central veins were imaged.

## 2024-09-20 NOTE — DISCHARGE SUMMARY
INTERVENTIONAL NEPHROLOGY DISCHARGE SUMMARY         Patient Name: Jyoti Mullinsfreeman SILVESTRE 1940    Procedure Date: 2024      In brief, Ms. Sharma underwent fistulogram of her L BA AVG due to difficult cannulations. Angiogram did not reveal thrombus, but did show a mild-moderate stenosis at the venous anastomosis, which successfully underwent angioplasty.    I believe cannulation difficulties are occurring due to the thrombosed access that is just medial to the distal body of the graft. The distal body of the graft is also somewhat deeper than the rest of the graft. It has been traced with a permanent marker.        Pt will have HD before being discharged due to hyperkalemia.    Pre-Op Diagnosis: T82.590A Mechanical complication of surgically created arteriovenous shunt, initial encounter, N18.6 End Stage Renal Disease (ESRD)  Post-Op Diagnosis: Same.    Discharge Instructions/Recommendations:  - Continue use of graft.  - Pt may be discharged after successful monitoring in post-op area.  - Pt may resume home medications.    Thank you for allowing me the opportunity in taking care of this patient. Please reach me with any questions.    Deshawn Rangel DO  Interventional Nephrology  Cell: [phone]

## 2024-09-20 NOTE — Clinical Note
35 ml of contrast were injected throughout the case. 25 mL of contrast was the total wasted during the case. 60 mL was the total amount used during the case.

## 2024-09-25 VITALS
TEMPERATURE: 98 F | WEIGHT: 177.5 LBS | SYSTOLIC BLOOD PRESSURE: 163 MMHG | DIASTOLIC BLOOD PRESSURE: 65 MMHG | BODY MASS INDEX: 34.85 KG/M2 | RESPIRATION RATE: 19 BRPM | HEART RATE: 53 BPM | OXYGEN SATURATION: 97 % | HEIGHT: 60 IN

## 2024-12-16 ENCOUNTER — HOSPITAL ENCOUNTER (OUTPATIENT)
Dept: RADIOLOGY | Facility: HOSPITAL | Age: 84
Discharge: HOME | End: 2024-12-16
Attending: INTERNAL MEDICINE
Payer: MEDICARE

## 2024-12-16 DIAGNOSIS — Z12.31 ENCOUNTER FOR SCREENING MAMMOGRAM FOR BREAST CANCER: ICD-10-CM

## 2024-12-16 PROCEDURE — 77063 BREAST TOMOSYNTHESIS BI: CPT | Mod: TC

## 2024-12-16 PROCEDURE — 77063 BREAST TOMOSYNTHESIS BI: CPT | Mod: 26,,, | Performed by: STUDENT IN AN ORGANIZED HEALTH CARE EDUCATION/TRAINING PROGRAM

## 2024-12-16 PROCEDURE — 77067 SCR MAMMO BI INCL CAD: CPT | Mod: 26,,, | Performed by: STUDENT IN AN ORGANIZED HEALTH CARE EDUCATION/TRAINING PROGRAM

## 2025-01-29 ENCOUNTER — HOSPITAL ENCOUNTER (OUTPATIENT)
Dept: RADIOLOGY | Facility: HOSPITAL | Age: 85
Discharge: HOME | End: 2025-01-29
Attending: INTERNAL MEDICINE
Payer: MEDICARE

## 2025-01-29 DIAGNOSIS — M81.0 SENILE OSTEOPOROSIS: ICD-10-CM

## 2025-01-29 PROCEDURE — 77080 DXA BONE DENSITY AXIAL: CPT | Mod: TC

## (undated) DEVICE — GUIDEWIRE STD .035X180CM ANG

## (undated) DEVICE — COVER PROBE US 5.5X58L NON LTX

## (undated) DEVICE — SUT PROLENE 6-0 24 BV-1

## (undated) DEVICE — SLING ARM LARGE

## (undated) DEVICE — CATH CHARGER .035IN 7X40X75

## (undated) DEVICE — GLOVE SENSICARE PI GRN 6.5

## (undated) DEVICE — Device

## (undated) DEVICE — FLOWSWITCH HP 1-W W/O LL

## (undated) DEVICE — SUT VICRYL 3-0 27 SH

## (undated) DEVICE — NDL SYR 10ML 18X1.5 LL BLUNT

## (undated) DEVICE — SUT GORETEX CV-6 TTC-09 24IN

## (undated) DEVICE — ELECTRODE PATIENT RETURN DISP

## (undated) DEVICE — CATH CONQUEST 40 8X6X5.5X75

## (undated) DEVICE — CLIP LIGATING HEMOCLP SMALL

## (undated) DEVICE — NDL HYPO REG 25G X 1 1/2

## (undated) DEVICE — SUT SILK 0 BLK BR CT-1 30IN

## (undated) DEVICE — SUT MCRYL PLUS 4-0 PS2 27IN

## (undated) DEVICE — PRESTO INFLATION DEVICE

## (undated) DEVICE — DRESSING TEGADERM CHG 3.5X4.5

## (undated) DEVICE — TRAY CENT PREM SUT REM PK SGL

## (undated) DEVICE — ADHESIVE DERMABOND ADVANCED

## (undated) DEVICE — CONTRAST ISOVUE 300 50ML

## (undated) DEVICE — SYR 30CC LUER LOCK

## (undated) DEVICE — GLOVE SIGNATURE ESSNTL LTX 6.5

## (undated) DEVICE — DRAPE UTILITY W/ TAPE 20X30IN

## (undated) DEVICE — CATH GLIDE ANGLED 5FR 65CM

## (undated) DEVICE — KIT MINI STK MAX COAX 5FR 10CM

## (undated) DEVICE — SUT 2/0 30IN SILK BLK BRAI

## (undated) DEVICE — SUT 4-0 12-18IN SILK BLACK

## (undated) DEVICE — BAG MEDI-PLAST DECANTER C-FLOW

## (undated) DEVICE — SOL NORMAL USPCA 0.9%

## (undated) DEVICE — COUNT NDL FOAM MAGNET 40COUNT

## (undated) DEVICE — CANNULA NASAL ADLT EAR 25FT

## (undated) DEVICE — DRAPE C-ARM COVER EZ 36X28IN

## (undated) DEVICE — DRAPE INCISE IOBAN 2 13X13IN

## (undated) DEVICE — SUT 2-0 12-18IN SILK

## (undated) DEVICE — GLOVE SENSICARE PI SURG 6.5

## (undated) DEVICE — DRESSING QUIKCLOT 1.5X1.5FT

## (undated) DEVICE — BLLN DORADO 80X6X6

## (undated) DEVICE — SHEATH PINNACLE 6FR HIFLO

## (undated) DEVICE — KIT SUTURE REMOVAL SCIS FRCP

## (undated) DEVICE — DRESSING TRANS 4X4 TEGADERM

## (undated) DEVICE — TOWEL OR DISP STRL BLUE 4/PK

## (undated) DEVICE — SUT 3-0 12-18IN SILK

## (undated) DEVICE — KIT VASCULAR LAFAYETTE

## (undated) DEVICE — PAD PREP CUFFED NS 24X48IN

## (undated) DEVICE — SHEATH BRITE TIP 7FR 5.5CM

## (undated) DEVICE — SUT MONOCRYL 3-0 PS-2 UND

## (undated) DEVICE — CLIP LIGATING MEDIUM

## (undated) DEVICE — CATH ULTRAVERSE 035 9X40X75

## (undated) DEVICE — BOWL STERILE LG GRAD 32OZ